# Patient Record
Sex: MALE | Race: WHITE | NOT HISPANIC OR LATINO
[De-identification: names, ages, dates, MRNs, and addresses within clinical notes are randomized per-mention and may not be internally consistent; named-entity substitution may affect disease eponyms.]

---

## 2019-05-15 PROBLEM — Z00.00 ENCOUNTER FOR PREVENTIVE HEALTH EXAMINATION: Status: ACTIVE | Noted: 2019-05-15

## 2019-05-23 ENCOUNTER — APPOINTMENT (OUTPATIENT)
Dept: UROLOGY | Facility: CLINIC | Age: 54
End: 2019-05-23

## 2022-12-30 ENCOUNTER — APPOINTMENT (OUTPATIENT)
Dept: ORTHOPEDIC SURGERY | Facility: CLINIC | Age: 57
End: 2022-12-30
Payer: COMMERCIAL

## 2022-12-30 VITALS — HEIGHT: 74 IN | WEIGHT: 250 LBS | BODY MASS INDEX: 32.08 KG/M2

## 2022-12-30 DIAGNOSIS — Z78.9 OTHER SPECIFIED HEALTH STATUS: ICD-10-CM

## 2022-12-30 DIAGNOSIS — F41.9 ANXIETY DISORDER, UNSPECIFIED: ICD-10-CM

## 2022-12-30 DIAGNOSIS — E78.00 PURE HYPERCHOLESTEROLEMIA, UNSPECIFIED: ICD-10-CM

## 2022-12-30 DIAGNOSIS — I10 ESSENTIAL (PRIMARY) HYPERTENSION: ICD-10-CM

## 2022-12-30 DIAGNOSIS — F32.A ANXIETY DISORDER, UNSPECIFIED: ICD-10-CM

## 2022-12-30 PROCEDURE — 99204 OFFICE O/P NEW MOD 45 MIN: CPT

## 2022-12-30 RX ORDER — GABAPENTIN 100 MG
100 TABLET ORAL
Refills: 0 | Status: ACTIVE | COMMUNITY

## 2022-12-30 RX ORDER — BUPRENORPHINE HCL/NALOXONE HCL 8 MG-2 MG
TABLET, SUBLINGUAL SUBLINGUAL
Refills: 0 | Status: ACTIVE | COMMUNITY

## 2022-12-30 RX ORDER — CLONAZEPAM 0.5 MG/1
0.5 TABLET, ORALLY DISINTEGRATING ORAL
Refills: 0 | Status: ACTIVE | COMMUNITY

## 2022-12-30 RX ORDER — VILAZODONE HYDROCHLORIDE 40 MG/1
40 TABLET ORAL
Refills: 0 | Status: ACTIVE | COMMUNITY

## 2022-12-30 RX ORDER — LISINOPRIL 30 MG/1
TABLET ORAL
Refills: 0 | Status: ACTIVE | COMMUNITY

## 2022-12-30 RX ORDER — QUETIAPINE FUMARATE 100 MG/1
100 TABLET ORAL
Refills: 0 | Status: ACTIVE | COMMUNITY

## 2022-12-30 RX ORDER — OMEPRAZOLE 40 MG/1
40 CAPSULE, DELAYED RELEASE ORAL
Refills: 0 | Status: ACTIVE | COMMUNITY

## 2022-12-30 NOTE — DISCUSSION/SUMMARY
[de-identified] : The patient has tried  physical therapy, anti-inflammatories, rest, RICE, all with no relief. Let this note serve as a letter of medical necessity for MRI. They will have a left knee MRI to evaluate for MMT. They will follow up with me after test.\par ------------------------\par \par Home Exercise \par \par  The patient is instructed on a home exercise program. \par  \par RICE \par I explained to the patient that rest, ice, compression, and elevation would benefit them.  They may return to activity after follow-up or when they no longer have any pain. \par  \par Pain Guide Activities \par The patient was advised to let pain guide the gradual advancement of activities. \par  \par Activity Modification \par The patient was advised to modify their activities. \par  \par Dx / Natural History \par The patient was advised of the diagnosis.  The natural history of the pathology was explained in full to the patient in layman's terms.  Several different treatment options were discussed and explained in full to the patient including the risks and benefits of both surgical and non-surgical treatments.  All questions and concerns were answered.\par \par "Written by Curtis Beltran, acting as Scribe for Gustavo Schmitt M.D"\par

## 2022-12-30 NOTE — HISTORY OF PRESENT ILLNESS
[de-identified] : The patient is a 57 year old L hand dominant male who presents today complaining of left knee.  Swelling, medial and posterior knee pain. Has been using voltaren ice, heat and rest for treatment. Original injury occurred while walking. The patient felt a "lightning bolt" severe pain in the knee and couldn't’t walk for three days. Pain has slowly improved since onset. \par Date of Injury/Onset: 1 month ago\par Pain:    At Rest: 2/10 \par With Activity: 9 /10 \par Mechanism of injury: no specific injury.\par This is not a Work Related Injury being treated under Worker's Compensation.\par This is not an athletic injury occurring associated with an interscholastic or organized sports team.\par Quality of symptoms: instability, weakness, swelling, pain medial aspect of the knee\par Improves with: cane, rest, arthritic ointment, heat\par Worse with: stairs, walking, \par Prior treatment: talat Fabian\par Prior Imaging: Xray 12/20/22\par Out of work/sport: _, since _ retired\par School/Sport/Position/Occupation:\par Additional Information: ACL reconstruction L knee 1999, intermittent discomfort since then, he states over the past month the pain has become much worse with no specific new injury.\par

## 2022-12-30 NOTE — PHYSICAL EXAM
[5___] : hamstring 5[unfilled]/5 [Left] : left knee [All Views] : anteroposterior, lateral, skyline, and anteroposterior standing [Positive] : positive Sulma [de-identified] : Neurologic: normal coordination, normal DTR UE/LE , normal sensation, normal mood and affect, orientated and able to communicate.\par \par Skin: normal skin, no rash, no ulcers and no lesions.\par \par Constitutional: well developed and well nourished.\par \par  [] : non-antalgic [FreeTextEntry9] : Khushbu johnson 12/20/22 left knee xray: mild tricompartmental OA. Well placed Boyer jewell from ACL reconstruction (1999). [TWNoteComboBox7] : flexion 120 degrees [de-identified] : extension 20 degrees

## 2023-01-13 ENCOUNTER — APPOINTMENT (OUTPATIENT)
Dept: ORTHOPEDIC SURGERY | Facility: CLINIC | Age: 58
End: 2023-01-13

## 2023-04-18 ENCOUNTER — APPOINTMENT (OUTPATIENT)
Dept: ORTHOPEDIC SURGERY | Facility: CLINIC | Age: 58
End: 2023-04-18
Payer: COMMERCIAL

## 2023-04-18 VITALS — WEIGHT: 250 LBS | BODY MASS INDEX: 32.08 KG/M2 | HEIGHT: 74 IN

## 2023-04-18 PROCEDURE — 99214 OFFICE O/P EST MOD 30 MIN: CPT | Mod: 25

## 2023-04-18 PROCEDURE — 20611 DRAIN/INJ JOINT/BURSA W/US: CPT | Mod: LT

## 2023-04-18 NOTE — DISCUSSION/SUMMARY
[de-identified] : Reviewed and discussed all images with patient. All questions answered. Discussed surgical vs conservative management. Will pursue conservative management at this time.\par Reparel card and playmaker brace rx.\par Physical therapy prescribed for strengthening and stretching. \par Fu in 6 weeks.\par \par CSI injection of the left knee was provided.\par Aspirated 10cc of clear synovial fluid from left knee using ultrasound for proper needle placement.\par \par \par \par Left Knee Ultrasound Guided aspiration/steroid injection procedure note:\par Patient Identification\par Name/: Verbal with patient and/or family\par  \par Procedure Verification:\par Procedure confirmed with patient or family/designee\par Consent for procedure: Verbal Consent Given\par Relevant documentation completed, reviewed, and signed\par Clinical indications for procedure confirmed\par \par Time-out with all members of procedure team immediately prior to procedure:\par Correct patient identified. Agreement on procedure. Correct side and site.\par \par KNEE INTRAARTICULAR INJECTION - LEFT\par After verbal consent and identification of the correct patient and correct site, the LEFT superolateral knee was prepped using alcohol swabs and betadine. This was allowed time to air dry.  A mixture of Kenalog,  Bupicacaine  was injected into the left knee using a sterile 22G 1.5 inch needle.  The patient tolerated the procedure well.  A sterile dressing was placed.  After-care instructions were provided and included instructions to ice the area and to call if redness, pain, or fever develop.\par \par \par ULTRASOUND GUIDED ASPIRATION - PROCEDURE\par Risks and benefits of aspiration were explained to the patient including but not limited to infection, recurrent hemarthrosis, pain at needle site, and recurrence of effusion.  The knee was prepped in the usual sterile fashion and under sterile condition injection of 3 cc of lidocaine was injected into the subcutaneous tissue.  Followed by aspiration using an 18 gauge needle.   The patient was instructed to rest, ice, and place compression on the knee for at least 24 hours.   There were also instructed to call for fevers drainage, increasing pain or any other concerns. \par ------------------------\par \par Home Exercise \par \par  The patient is instructed on a home exercise program. \par  \par RICE \par I explained to the patient that rest, ice, compression, and elevation would benefit them.  They may return to activity after follow-up or when they no longer have any pain. \par  \par Pain Guide Activities \par The patient was advised to let pain guide the gradual advancement of activities. \par  \par Activity Modification \par The patient was advised to modify their activities. \par  \par Dx / Natural History \par The patient was advised of the diagnosis.  The natural history of the pathology was explained in full to the patient in layman's terms.  Several different treatment options were discussed and explained in full to the patient including the risks and benefits of both surgical and non-surgical treatments.  All questions and concerns were answered.\par \par "Written by Curtis Beltran, acting as Scribe for Gustavo Schmitt M.D"\par

## 2023-04-18 NOTE — HISTORY OF PRESENT ILLNESS
[de-identified] : The patient is a 57 year old L hand dominant male who presents today complaining of left knee. Original injury occurred while walking. The patient felt a "lightning bolt" severe pain in the knee and couldn't’t walk for three days. . \par Date of Injury/Onset: \par Pain: At Rest: 2/10 \par With Activity: 5 /10 \par Mechanism of injury: no specific injury.\par This is not a Work Related Injury being treated under Worker's Compensation.\par This is not an athletic injury occurring associated with an interscholastic or organized sports team.\par Quality of symptoms: instability, weakness, swelling, pain medial aspect of the knee\par Improves with: cane, rest, arthritic ointment, heat\par Worse with: stairs, walking, \par changes since last visit: MRI at [facility], sxs have improved \par Additional Information: ACL reconstruction L knee 1999, intermittent discomfort since then, he states over the past month the pain has become much worse with no specific new injury.\par  \par  \par

## 2023-04-18 NOTE — ASSESSMENT
[FreeTextEntry1] : 01/09/23\par [facility] Radiology MRI Left Knee\par Impression:\par Markedly limited study as the patient could not be completed due to significant susceptibility artifact from the ACL reconstruction. Quadriceps tendinopathy and insertional fraying. Joint effusion. \par Incompletely evaluated small popliteal cyst.

## 2023-04-18 NOTE — PHYSICAL EXAM
[5___] : hamstring 5[unfilled]/5 [Positive] : positive Sulma [Left] : left knee [All Views] : anteroposterior, lateral, skyline, and anteroposterior standing [de-identified] : Neurologic: normal coordination, normal DTR UE/LE , normal sensation, normal mood and affect, orientated and able to communicate.\par \par Skin: normal skin, no rash, no ulcers and no lesions.\par \par Constitutional: well developed and well nourished.\par \par  [] : non-antalgic [FreeTextEntry9] : Khushbu johnson 12/20/22 left knee xray: mild tricompartmental OA. Well placed Boyer jewell from ACL reconstruction (1999). [TWNoteComboBox7] : flexion 120 degrees [de-identified] : extension 0 degrees

## 2023-05-30 ENCOUNTER — APPOINTMENT (OUTPATIENT)
Dept: ORTHOPEDIC SURGERY | Facility: CLINIC | Age: 58
End: 2023-05-30

## 2023-10-04 ENCOUNTER — APPOINTMENT (OUTPATIENT)
Dept: NEUROSURGERY | Facility: CLINIC | Age: 58
End: 2023-10-04
Payer: COMMERCIAL

## 2023-10-04 ENCOUNTER — NON-APPOINTMENT (OUTPATIENT)
Age: 58
End: 2023-10-04

## 2023-10-04 VITALS
HEART RATE: 93 BPM | BODY MASS INDEX: 31.44 KG/M2 | HEIGHT: 74 IN | WEIGHT: 245 LBS | DIASTOLIC BLOOD PRESSURE: 76 MMHG | TEMPERATURE: 98 F | SYSTOLIC BLOOD PRESSURE: 153 MMHG | OXYGEN SATURATION: 96 %

## 2023-10-04 DIAGNOSIS — Z87.19 PERSONAL HISTORY OF OTHER DISEASES OF THE DIGESTIVE SYSTEM: ICD-10-CM

## 2023-10-04 PROCEDURE — 99203 OFFICE O/P NEW LOW 30 MIN: CPT

## 2023-10-04 RX ORDER — TESTOSTERONE 25 MG/2.5G
25 GEL TRANSDERMAL
Refills: 0 | Status: ACTIVE | COMMUNITY

## 2023-10-04 RX ORDER — FAMOTIDINE 40 MG/1
40 TABLET, FILM COATED ORAL
Refills: 0 | Status: ACTIVE | COMMUNITY

## 2023-10-04 RX ORDER — ATORVASTATIN CALCIUM 20 MG/1
20 TABLET, FILM COATED ORAL
Refills: 0 | Status: ACTIVE | COMMUNITY

## 2023-10-04 RX ORDER — CALCIUM CARBONATE/VITAMIN D3 600 MG-10
TABLET ORAL
Refills: 0 | Status: ACTIVE | COMMUNITY

## 2023-10-20 ENCOUNTER — APPOINTMENT (OUTPATIENT)
Dept: MRI IMAGING | Facility: CLINIC | Age: 58
End: 2023-10-20
Payer: COMMERCIAL

## 2023-10-20 ENCOUNTER — OUTPATIENT (OUTPATIENT)
Dept: OUTPATIENT SERVICES | Facility: HOSPITAL | Age: 58
LOS: 1 days | End: 2023-10-20

## 2023-10-20 ENCOUNTER — APPOINTMENT (OUTPATIENT)
Dept: RADIOLOGY | Facility: CLINIC | Age: 58
End: 2023-10-20
Payer: COMMERCIAL

## 2023-10-20 ENCOUNTER — OUTPATIENT (OUTPATIENT)
Dept: OUTPATIENT SERVICES | Facility: HOSPITAL | Age: 58
LOS: 1 days | End: 2023-10-20
Payer: COMMERCIAL

## 2023-10-20 DIAGNOSIS — M48.02 SPINAL STENOSIS, CERVICAL REGION: ICD-10-CM

## 2023-10-20 DIAGNOSIS — Z00.8 ENCOUNTER FOR OTHER GENERAL EXAMINATION: ICD-10-CM

## 2023-10-20 PROCEDURE — 74018 RADEX ABDOMEN 1 VIEW: CPT | Mod: 26

## 2023-10-20 PROCEDURE — 72148 MRI LUMBAR SPINE W/O DYE: CPT | Mod: 26

## 2023-10-20 PROCEDURE — 71045 X-RAY EXAM CHEST 1 VIEW: CPT | Mod: 26

## 2023-10-20 PROCEDURE — 72141 MRI NECK SPINE W/O DYE: CPT | Mod: 26

## 2023-10-20 PROCEDURE — 74018 RADEX ABDOMEN 1 VIEW: CPT

## 2023-10-20 PROCEDURE — 71045 X-RAY EXAM CHEST 1 VIEW: CPT

## 2023-10-20 PROCEDURE — 72148 MRI LUMBAR SPINE W/O DYE: CPT

## 2023-10-20 PROCEDURE — 72141 MRI NECK SPINE W/O DYE: CPT

## 2023-10-26 ENCOUNTER — NON-APPOINTMENT (OUTPATIENT)
Age: 58
End: 2023-10-26

## 2023-11-15 ENCOUNTER — APPOINTMENT (OUTPATIENT)
Dept: NEUROSURGERY | Facility: CLINIC | Age: 58
End: 2023-11-15
Payer: COMMERCIAL

## 2023-11-15 ENCOUNTER — TRANSCRIPTION ENCOUNTER (OUTPATIENT)
Age: 58
End: 2023-11-15

## 2023-11-15 VITALS
OXYGEN SATURATION: 97 % | HEIGHT: 74 IN | WEIGHT: 245 LBS | HEART RATE: 85 BPM | BODY MASS INDEX: 31.44 KG/M2 | DIASTOLIC BLOOD PRESSURE: 88 MMHG | TEMPERATURE: 98.1 F | SYSTOLIC BLOOD PRESSURE: 144 MMHG

## 2023-11-15 PROCEDURE — 99214 OFFICE O/P EST MOD 30 MIN: CPT

## 2023-11-29 ENCOUNTER — APPOINTMENT (OUTPATIENT)
Dept: CT IMAGING | Facility: CLINIC | Age: 58
End: 2023-11-29
Payer: COMMERCIAL

## 2023-11-29 ENCOUNTER — OUTPATIENT (OUTPATIENT)
Dept: OUTPATIENT SERVICES | Facility: HOSPITAL | Age: 58
LOS: 1 days | End: 2023-11-29
Payer: COMMERCIAL

## 2023-11-29 DIAGNOSIS — M48.02 SPINAL STENOSIS, CERVICAL REGION: ICD-10-CM

## 2023-11-29 PROCEDURE — 72125 CT NECK SPINE W/O DYE: CPT

## 2023-11-29 PROCEDURE — 72125 CT NECK SPINE W/O DYE: CPT | Mod: 26

## 2023-12-04 ENCOUNTER — NON-APPOINTMENT (OUTPATIENT)
Age: 58
End: 2023-12-04

## 2023-12-04 ENCOUNTER — APPOINTMENT (OUTPATIENT)
Dept: NEUROSURGERY | Facility: CLINIC | Age: 58
End: 2023-12-04
Payer: COMMERCIAL

## 2023-12-04 VITALS
OXYGEN SATURATION: 95 % | HEIGHT: 74 IN | BODY MASS INDEX: 31.44 KG/M2 | DIASTOLIC BLOOD PRESSURE: 87 MMHG | TEMPERATURE: 97.8 F | WEIGHT: 245 LBS | SYSTOLIC BLOOD PRESSURE: 135 MMHG | HEART RATE: 84 BPM

## 2023-12-04 PROCEDURE — 99214 OFFICE O/P EST MOD 30 MIN: CPT

## 2024-01-16 ENCOUNTER — APPOINTMENT (OUTPATIENT)
Dept: ORTHOPEDIC SURGERY | Facility: CLINIC | Age: 59
End: 2024-01-16
Payer: COMMERCIAL

## 2024-01-16 VITALS — BODY MASS INDEX: 31.44 KG/M2 | WEIGHT: 245 LBS | HEIGHT: 74 IN

## 2024-01-16 DIAGNOSIS — M25.562 PAIN IN RIGHT KNEE: ICD-10-CM

## 2024-01-16 DIAGNOSIS — M25.562 PAIN IN LEFT KNEE: ICD-10-CM

## 2024-01-16 DIAGNOSIS — M79.18 MYALGIA, OTHER SITE: ICD-10-CM

## 2024-01-16 DIAGNOSIS — M25.561 PAIN IN RIGHT KNEE: ICD-10-CM

## 2024-01-16 PROCEDURE — 99214 OFFICE O/P EST MOD 30 MIN: CPT | Mod: 25

## 2024-01-16 PROCEDURE — 20611 DRAIN/INJ JOINT/BURSA W/US: CPT | Mod: LT

## 2024-01-16 PROCEDURE — 20610 DRAIN/INJ JOINT/BURSA W/O US: CPT | Mod: RT

## 2024-01-16 PROCEDURE — 73564 X-RAY EXAM KNEE 4 OR MORE: CPT | Mod: 50

## 2024-01-16 NOTE — PHYSICAL EXAM
[de-identified] : Left Knee: medial joint line tenderness +medial Thais's +locking Full ROM  Pain with full extension  Medial buckling

## 2024-01-16 NOTE — HISTORY OF PRESENT ILLNESS
[de-identified] : The patient is a 58 year  old  {LEFT] hand dominant male who presents today complaining of BL knees   Date of Injury/Onset: 1 years Pain:    At Rest: 2/10  With Activity:  6/10  Mechanism of injury: n/a  This is NOT a Work Related Injury being treated under Worker's Compensation. This is NOT an athletic injury occurring associated with an interscholastic or organized sports team. Quality of symptoms: sharp, aching  Improves with: CSI, ice/ heat, voltaren Worse with: stairs, prolonged activity, bending  Treatment/Imaging/Studies Since Last Visit: n/a 	Reports Available For Review Today: _ Out of work/sport: _, since _ School/Sport/Position/Occupation: retired  Change since last visit: csi last visit in April helped the left knee significantly, he reports new onset pain to the right knee  Additional Information: None

## 2024-01-16 NOTE — DISCUSSION/SUMMARY
[de-identified] : Reviewed all images with patient.  RB&A to corticosteroid injection discussed. All questions were answered. Patient wishes to move forward with injection today.  Right Knee Ultrasound Guided aspiration/steroid injection procedure note: Patient Identification Name/: Verbal with patient and/or family   Procedure Verification: Procedure confirmed with patient or family/designee Consent for procedure: Verbal Consent Given Relevant documentation completed, reviewed, and signed Clinical indications for procedure confirmed  Time-out with all members of procedure team immediately prior to procedure: Correct patient identified. Agreement on procedure. Correct side and site.  KNEE INTRAARTICULAR INJECTION - RIGHT After verbal consent and identification of the correct patient and correct site, the RIGHT superolateral knee was prepped using alcohol swabs and betadine. This was allowed time to air dry.  A mixture of Kenalog,  Bupicacaine  was injected into the right knee using a sterile 22G 1.5 inch needle.  The patient tolerated the procedure well.  A sterile dressing was placed.  After-care instructions were provided and included instructions to ice the area and to call if redness, pain, or fever develop.   Left Knee Ultrasound Guided aspiration/steroid injection procedure note: Patient Identification Name/: Verbal with patient and/or family   Procedure Verification: Procedure confirmed with patient or family/designee Consent for procedure: Verbal Consent Given Relevant documentation completed, reviewed, and signed Clinical indications for procedure confirmed  Time-out with all members of procedure team immediately prior to procedure: Correct patient identified. Agreement on procedure. Correct side and site.  KNEE INTRAARTICULAR INJECTION - LEFT After verbal consent and identification of the correct patient and correct site, the LEFT superolateral knee was prepped using alcohol swabs and betadine. This was allowed time to air dry.  A mixture of Kenalog,  Bupicacaine  was injected into the left knee using a sterile 22G 1.5 inch needle.  The patient tolerated the procedure well.  A sterile dressing was placed.  After-care instructions were provided and included instructions to ice the area and to call if redness, pain, or fever develop.   ----------------------------------------------- Home Exercise The patient is instructed on a home exercise program.  STEPHANIE BOWER Acting as a Scribe for Dr. Keshia HILL, Stephanie Bower, attest that this documentation has been prepared under the direction and in the presence of Provider Gustavo Schmitt MD.  Activity Modification The patient was advised to modify their activities.  Dx / Natural History The patient was advised of the diagnosis.  The natural history of the pathology was explained in full to the patient in layman's terms.  Several different treatment options were discussed and explained in full to the patient including the risks and benefits of both surgical and non-surgical treatments.  All questions and concerns were answered.  Pain Guide Activities The patient was advised to let pain guide the gradual advancement of activities.  RICE I explained to the patient that rest, ice, compression, and elevation would benefit them.  They may return to activity after follow-up or when they no longer have any pain.  The patient's current medication management of their orthopedic diagnosis was reviewed today: (1) We discussed a comprehensive treatment plan that included possible pharmaceutical management involving the use of prescription strength medications including but not limited to options such as oral Naprosyn 500mg BID, once daily Meloxicam 15 mg, or 500-650 mg Tylenol versus over the counter oral medications and topical prescription NSAID Pennsaid vs over the counter Voltaren gel. (2) There is a moderate risk of morbidity with further treatment, especially from use of prescription strength medications and possible side effects of these medications which include upset stomach with oral medications, skin reactions to topical medications and cardiac/renal issues with long term use. (3) I recommended that the patient follow-up with their medical physician to discuss any significant specific potential issues with long term medication use such as interactions with current medications or with exacerbation of underlying medical comorbidities. (4) The benefits and risks associated with use of injectable, oral or topical, prescription and over the counter anti-inflammatory medications were discussed with the patient. The patient voiced understanding of the risks including but not limited to bleeding, stroke, kidney dysfunction, heart disease, and were referred to the black box warning label for further information.

## 2024-01-16 NOTE — DATA REVIEWED
[FreeTextEntry1] : 01/16/24 [facility] X-Ray Examination of the RIGHT KNEE: 4 views: Moderate tricompartmental OA

## 2024-02-06 ENCOUNTER — TRANSCRIPTION ENCOUNTER (OUTPATIENT)
Age: 59
End: 2024-02-06

## 2024-02-15 ENCOUNTER — TRANSCRIPTION ENCOUNTER (OUTPATIENT)
Age: 59
End: 2024-02-15

## 2024-02-29 ENCOUNTER — APPOINTMENT (OUTPATIENT)
Dept: NEUROSURGERY | Facility: CLINIC | Age: 59
End: 2024-02-29
Payer: COMMERCIAL

## 2024-02-29 VITALS
TEMPERATURE: 97.8 F | BODY MASS INDEX: 31.44 KG/M2 | HEART RATE: 82 BPM | OXYGEN SATURATION: 97 % | HEIGHT: 74 IN | DIASTOLIC BLOOD PRESSURE: 96 MMHG | WEIGHT: 245 LBS | SYSTOLIC BLOOD PRESSURE: 147 MMHG

## 2024-02-29 PROCEDURE — 99213 OFFICE O/P EST LOW 20 MIN: CPT

## 2024-03-07 NOTE — HISTORY OF PRESENT ILLNESS
[FreeTextEntry1] : Mr. Briscoe presents for follow-up.  He has continued improvement in symptoms referable to his neck and cervical DDD.  The patient has persistent back and LE pain without incontinence or balance difficulty.  The patient has started PT.  He noted distal LE weakness after a bumpy car ride which has since improved.

## 2024-03-07 NOTE — ASSESSMENT
[FreeTextEntry1] : Mr. Briscoe presents with interval history as above.  He has noted improvement with PT but had a recent setback after a car ride.  I will see the patient back in 4-6 weeks to assess his progress with PT.  He knows to call the office with any new or concerning symptoms in the interim.

## 2024-03-07 NOTE — PHYSICAL EXAM
[General Appearance - Alert] : alert [General Appearance - In No Acute Distress] : in no acute distress [General Appearance - Well Nourished] : well nourished [General Appearance - Well Developed] : well developed [General Appearance - Well-Appearing] : healthy appearing [Oriented To Time, Place, And Person] : oriented to person, place, and time [Person] : oriented to person [Place] : oriented to place [Time] : oriented to time [Short Term Intact] : short term memory intact [Concentration Intact] : normal concentrating ability [Fluency] : fluency intact [Cranial Nerves Optic (II)] : visual acuity intact bilaterally,  pupils equal round and reactive to light [Cranial Nerves Oculomotor (III)] : extraocular motion intact [Cranial Nerves Facial (VII)] : face symmetrical [Cranial Nerves Hypoglossal (XII)] : there was no tongue deviation with protrusion [Motor Tone] : muscle tone was normal in all four extremities [Sensation Tactile Decrease] : light touch was intact [Motor Strength] : muscle strength was normal in all four extremities [FreeTextEntry6] : UE and LE 5/5 bilaterally

## 2024-04-01 ENCOUNTER — APPOINTMENT (OUTPATIENT)
Dept: PHYSICAL MEDICINE AND REHAB | Facility: CLINIC | Age: 59
End: 2024-04-01
Payer: COMMERCIAL

## 2024-04-01 VITALS
SYSTOLIC BLOOD PRESSURE: 165 MMHG | RESPIRATION RATE: 15 BRPM | HEIGHT: 74 IN | HEART RATE: 102 BPM | WEIGHT: 240 LBS | BODY MASS INDEX: 30.8 KG/M2 | DIASTOLIC BLOOD PRESSURE: 74 MMHG

## 2024-04-01 PROCEDURE — 99204 OFFICE O/P NEW MOD 45 MIN: CPT

## 2024-04-01 RX ORDER — PRAVASTATIN SODIUM 40 MG/1
40 TABLET ORAL
Refills: 0 | Status: DISCONTINUED | COMMUNITY
End: 2024-04-01

## 2024-04-01 NOTE — PHYSICAL EXAM
[FreeTextEntry1] : PE: Constitutional: In NAD, calm and cooperative MSK (Neck and Back) 	Inspection: no gross swelling identified 	Palpation: Tenderness of the bilateral lower cervical and lumbar paraspinals 	ROM: Pain at end cervical extension/flexion and with lumbar extension/flexion 	Strength: 5/5 strength in bilateral upper and lower extremities 	Reflexes: 2+ Biceps/Brachioradialis/Triceps/patella/Achilles reflex bilaterally, Hoffmans/Clonus negative bilaterally 	Sensation: Intact to light touch in bilateral upper and lower extremities 	Special tests: Spurlings test negative bilaterally, SLR negative bilaterally, SAGAR/FADIR negative bilaterally

## 2024-04-01 NOTE — ASSESSMENT
[FreeTextEntry1] : Mr. BRYAN OLIVIER is a 59 year old male who presents with chronic neck and low back pain, worst in the low back with some radiation down his bilateral LE. Patient has significant spondylosis seen on MRI. Denies any red flag signs. Will recommend: - Discussed with patient the risks (including but not limited to bleeding, elevated blood sugar, allergic reaction, infection, nerve damage, etc), benefits and alternatives to an epidural steroid injection for which patient understands and would like to proceed. Given significant foraminal stenosis at L5-S1, will plan for a bilateral S1 TFESI.  - Patient to f/u with neurosurgery regarding his significant cervical stenosis  Return for procedure. Patient aware of red flag signs including any changes to their bowel/bladder control, groin numbness or new weakness. Patient knows to seek immediate attention by calling 911 or going to nearest ER if these symptoms appear.

## 2024-04-01 NOTE — DATA REVIEWED
[FreeTextEntry1] : EXAM: [id number] - MR SPINE CERVICAL  - ORDERED BY:  RASHID BONE   PROCEDURE DATE:  10/20/2023    INTERPRETATION:  MR CERVICAL SPINE  CLINICAL INFORMATION: patient has C5- C6  gait instability; TECHNIQUE: MR CERVICAL SPINE COMPARISON: None available.  FINDINGS:  DISC LEVEL EVALUATION:  C1/C2: Spurring at the atlantodental articulation. No central canal narrowing. C2/C3: Tiny central protrusion. Bulky left facet arthrosis. Right uncovertebral spurring. Mild right and moderate to severe left foraminal narrowing. C3/C4: Small central protrusion with right greater than left uncovertebral spurring. Moderate left facet arthrosis. Mild narrowing of the spinal canal. Moderate right and moderate to severe left foraminal narrowing. C4/C5: Diffuse disc bulge asymmetric to the left. Bulky left greater than right uncovertebral spurring. Mild bilateral facet arthrosis. Moderate spinal canal stenosis. Severe left greater than right foraminal narrowing. C5/C6: Diffuse disc bulge with small amount of disc material extruded centrally which indents the cord. Moderate spinal canal stenosis. Severe left greater than right foraminal narrowing. C6/C7: Mild disc bulge. Mild spinal canal stenosis. Moderate to severe right greater than left foraminal narrowing. C7/T1: Broad central protrusion. Bulky left facet arthrosis. Mild to moderate spinal canal stenosis. Mild left foraminal narrowing. T1/T2: Only evaluated in the sagittal plane. Broad central protrusion which narrows the spinal canal and severe right greater than left foraminal narrowing.  ALIGNMENT: Straightening of the normal cervical lordosis. Slight anterolisthesis of C7 on T1. Slight retrolisthesis of C6 on C7. CORD: No cord signal abnormality. MARROW: No fracture. Endplate degenerative changes at C5-C6. DISCS: Moderate multilevel degenerative changes with loss of disc heights and disc signal. IMAGED BRAIN: Normal. PERIPHERAL/NECK SOFT TISSUES: Normal.  IMPRESSION:  Moderate multilevel cervical spondylosis, most notable at C5-C6 with impingement of the cord by extruded disc material and severe left greater than right foraminal narrowing. Additional levels of significant spinal canal stenosis and foraminal narrowing.  --- End of Report ---       SANGEETA HODGE MD; Attending Radiologist This document has been electronically signed. Oct 25 2023  4:58PM  	 EXAM: [id number] - MR SPINE LUMBAR  - ORDERED BY:  RASHID BONE   PROCEDURE DATE:  10/20/2023    INTERPRETATION:  CLINICAL INFORMATION: Low back pain radiating into the lower extremity.  ADDITIONAL CLINICAL INFORMATION: Other Condition see Clinical Info  TECHNIQUE: Multiplanar, multisequence MRI was performed of the lumbar spine. IV Contrast: None  PRIOR STUDIES: No priors available for comparison.  FINDINGS:  BONES: No fracture. Endplate degenerative changes at L1-L2, L2 on L3 and L3-4. Extensive endplate marrow edema at L1-L2. ALIGNMENT: Mild broad dextrocurvature. Slight retrolisthesis of T12 on L1, L1 on L2, L2 on L3 and L5 on S1. SACROILIAC JOINTS/SACRUM: There is no sacral fracture. The SI joints are partially visualized but are intact. CONUS AND CAUDA EQUINA: The distal cord and conus are normal in signal. Conus terminates at L1. VISUALIZED INTRAPELVIC/INTRA-ABDOMINAL SOFT TISSUES: Normal. PARASPINAL SOFT TISSUES: Paraspinal muscle atrophy. Mild prominence of the epidural fat.   INDIVIDUAL LEVELS: Multilevel loss of disc height and disc signal, severe at L3-L4.  LOWER THORACIC SPINE: Mild disc herniations at T10-T11 and T11-T12 minimally narrowing the spinal canal without significant foraminal narrowing.  T12/L1: Diffuse disc bulge. Mild to moderate spinal canal stenosis. Mild right greater than left foraminal narrowing. Likely contact on the right T12 exiting nerve. L1-L2: Diffuse disc bulge. Mild bilateral facet arthrosis. Complete effacement of the CSF. Moderate spinal canal narrowing. Moderate right and moderate to severe left foraminal narrowing. L2-L3: Diffuse disc bulge. Mild bilateral facet arthrosis. Near-complete effacement of the CSF. Mild to moderate spinal canal stenosis. Mild right and moderate to severe left foraminal narrowing. L3-L4: Diffuse disc bulge with osseous ridging asymmetric to the right. Bulky right and mild left facet arthrosis. Mild spinal canal stenosis. Moderate to severe right greater than left foraminal narrowing. Narrowing of the right lateral recess. Contact on the right L3 exiting nerve. L4-L5: Mild disc bulge. Moderate to severe right and moderate left facet arthrosis. Mild spinal canal stenosis. Mild to moderate bilateral foraminal narrowing. L5-S1: Diffuse disc bulge which contacts the L5 exiting nerves. Moderate bilateral facet arthrosis. Mild narrowing of the spinal canal. Severe bilateral foraminal narrowing.   IMPRESSION:  Moderate to severe multilevel lumbar spondylosis in the setting of epidural lipomatosis with multilevel spinal canal and foraminal narrowing.  --- End of Report ---       SANGEETA HODGE MD; Attending Radiologist This document has been electronically signed. Oct 25 2023  5:08PM  EXAM: [id number] - CT CERVICAL SPINE  - ORDERED BY:  RASHID BONE   PROCEDURE DATE:  11/29/2023    INTERPRETATION:  Clinical indication: Cervical stenosis  Technique: CT of the cervical spine without intravenous contrast was performed utilizing helically obtained axial images from the occiput through T1. Images were reformatted into coronal and sagittal orientation.  Comparison: 10/20/2023  Findings:  There is no acute fracture or subluxation of the cervical spine. There is no significant listhesis. There is straightening of the normal cervical lordosis. The vertebral bodies are of normal height and configuration. There is no evidence of prevertebral soft tissue swelling.  C2-C3 level: Moderate bilateral facet arthropathy contributes to mild bilateral neural foraminal narrowing but no significant central canal narrowing.  C3-C4 level: Mild to moderate bilateral facet arthropathy and mild bilateral uncovertebral hypertrophy results in mild to moderate bilateral neural foraminal narrowing but no significant central canal narrowing.  C4-C5 level:  Broad-based posterior disc osteophyte complex with superimposed central protrusion and moderate bilateral facet arthropathy result in moderate bilateral neural foraminal narrowing and mild central canal narrowing.  C5-C6 level: Broad-based posterior disc osteophyte complex with superimposed central disc protrusion in conjunction with moderate bilateral facet arthropathy results in moderate bilateral neural foraminal narrowing and moderate central canal narrowing.  C6-C7 level: Moderate bilateral facet arthropathy and broad-based posterior disc bulge result in moderate right neural foraminal narrowing, mild left neural foraminal narrowing but no significant central canal narrowing.  C7-T1 level: No disc herniation. No central canal or foraminal narrowing.   The prevertebral soft tissues are within normal limits. The lung apices are clear.  Impression:  Multilevel discogenic degenerative disease and facet arthropathy of the cervical spine, most pronounced at C5-C6 where there is moderate bilateral neural foraminal narrowing and moderate central canal narrowing. Additional varying degrees of neural foraminal narrowing, as above.  --- End of Report ---       FRANTZ GARCÍA MBA-FARIDA RENTERIA; Attending Radiologist This document has been electronically signed. Dec  4 2023 11:15AM

## 2024-04-01 NOTE — HISTORY OF PRESENT ILLNESS
[FreeTextEntry1] : Mr. BRYAN OLIVIER  is a 59 year old male who presents with neck and back pain.   Location: Both neck and low back, but worst in low back Onset: Chronic for years but worsening since Fall 2023, no inciting events Provocation/Palliative: Worst with activity, better with rest Quality:Shooting Radiation: Down LUE, but also down bilateral LE, R>L Severity: Moderate to severe Timing: Comes and goes  Denies any associated numbness. Denies any associated arm or hand weakness. Denies any loss of bowel/bladder control or any groin numbness. Previous medications trialed: Tylenol with some help, Gabapentin Previous procedures relevant to complaint: None Has tried conservative treatment?: PT x 6 weeks without significant relief

## 2024-04-04 ENCOUNTER — APPOINTMENT (OUTPATIENT)
Dept: NEUROSURGERY | Facility: CLINIC | Age: 59
End: 2024-04-04
Payer: COMMERCIAL

## 2024-04-04 PROCEDURE — 99214 OFFICE O/P EST MOD 30 MIN: CPT

## 2024-04-04 NOTE — REVIEW OF SYSTEMS
[As Noted in HPI] : as noted in HPI [Numbness] : numbness [Tingling] : tingling [Difficulty Walking] : difficulty walking [Negative] : Heme/Lymph

## 2024-04-10 ENCOUNTER — TRANSCRIPTION ENCOUNTER (OUTPATIENT)
Age: 59
End: 2024-04-10

## 2024-04-18 ENCOUNTER — APPOINTMENT (OUTPATIENT)
Dept: PHYSICAL MEDICINE AND REHAB | Facility: AMBULATORY SURGERY CENTER | Age: 59
End: 2024-04-18
Payer: COMMERCIAL

## 2024-04-18 PROCEDURE — 64483 NJX AA&/STRD TFRM EPI L/S 1: CPT | Mod: 50

## 2024-04-18 NOTE — PROCEDURE
[de-identified] : S1 Transforaminal Epidural Steroid Injection    Attending: Irwin Henriquez DO  PREOPERATIVE DIAGNOSIS: Lumbosacral Radiculopathy POSTOPERATIVE DIAGNOSIS: same  SEDATION: As per Anesthesia   PROCEDURE PERFORMED: Bilateral S1 Transforaminal Epidural Steroid Injection   ESTIMATED BLOOD LOSS:  None FLUOROSCOPY WAS USED.    PROCEDURE AND FINDINGS:   Patient was greeted in the pre-procedure holding area. The risk, benefits and alternatives to the procedure were again reviewed with the patient and written informed consent was placed in the chart. They were taken to the procedure room and positioned prone on the fluoroscopy table.  Prior to the procedure a time out was completed, verifying correct patient, procedure, and site.     An AP fluoroscopic  film was taken to identify the vertebral bodies and pedicles of interest. The fluoroscope was then obliqued ipsilaterally until the foramen was optimally visualized. The skin was prepped with chlorhexidine and draped in the usual sterile fashion. The skin and subcutaneous tissue overlying the aforementioned anatomic targets were anesthetized using a 27-gauge 1-1/2 inch needle with 1% preservative-free lidocaine for a total volume of 6 mls.     Then a 22-gauge 3.5 inch Quincke spinal needle was advanced under fluoroscopic guidance using an oblique view just inferior to the S1 pedicle. Then, utilizing AP and lateral fluoroscopic views, the position of the needle tip was confirmed to be within the neural foramen. After negative aspiration, 2 mls of contrast was injected under AP view on each side and confirmed adequate spread along the nerve root and in the epidural space. There was no evidence of intravascular uptake or intrathecal spread on imaging.    At this point, after negative aspiration, a total of 2 mL volume of treatment injectate, consisting of 0.5 mL of dexamethasone 10mg/mL, and 1.5 mL of Preservative Free Normal Saline was injected easily on each side.  The needles were then flushed with 1 ml of saline and removed. The needle insertion sites were dressed appropriately.    Patient was taken to the recovery room where they were monitored for a brief period of time. They tolerated the procedure well and were discharged home in stable condition with post procedural instructions.

## 2024-04-18 NOTE — DATA REVIEWED
[de-identified] : DATE OF EXAM: 07/03/2015 R. Phys. Name: Wanda Rosen R. Phys. Address: [address] [place] [address], [place], [facility] R. Phys. Phone: [phone] History: Neck pain with radiculopathy  MRI-3T CERVICAL SPINE W/O CON  MRI examination of the cervical spine was performed 3.0 Mari ultra high field wide bore magnet. Multiplanar multi-sequential techniques were used.  Sagittal imaging demonstrates normal alignment with straightening and a degenerative appearance of the vertebral bodies. The spinal cord has normal signal.  At C4-5 there is a central left lateral recess osteophytic ridge with asymmetric mass effect upon the exiting left C5 nerve root.  At C5-6 there is a large central slightly right-sided osteophytic disc ridge with moderate right paracentral cord compression mass effect upon the exiting C6 nerve roots right greater than left is also noted.  Review of the remaining levels does not demonstrate evidence of focal abnormality, disc herniation or stenosis.  IMPRESSION:   Multilevel degenerative spondylitic change most notable at C5-6 with there is moderate cord compression, see discussion above.  Signed by: Umer Medina MD Signed Date: 7/3/2015 12:17 PM   SIGNED BY: Umer Medina M.D., Ext. [phone] 07/03/2015 12:17 PM  IMPRESSION:   Multilevel degenerative spondylitic change most notable at C5-6 with there is moderate cord compression, see discussion above.  [de-identified] : DATE OF EXAM: 07/03/2015 R. Phys. Name: Wanda Rosen R. Phys. Address: [address] [place] [address], [place], [facility] R. Phys. Phone: [phone] History: Low back pain  MRI-3T LUMBAR SPINE W/O CON  MRI examination of the lumbosacral spine was performed on a 3.0 Mari ultra high field wide bore magnet and multiplanar multi-sequential techniques were used.  Examination of the sagittal imaging demonstrates normal alignment and a degenerative appearance of the vertebral bodies. The conus medullaris is seen to be normal.  The intervertebral disks demonstrate decreased T2 signal.  At L5-S1 there is a focal disc protrusion within the left neural foramen with slight mass effect upon the intraforaminal left L5 nerve root.  At L3-4 there is a focal disc herniation within the right neural foramen associated with diffuse degenerative bulging discs there is asymmetric mass effect upon the exiting right L4 and intraforaminal L3 nerve roots.  At L2-3 there is a diffuse degenerative bulging disc with moderate central and moderate to severe bilateral foraminal stenosis.  Review of the remaining levels does not demonstrate evidence of focal abnormality, disc herniation or stenosis.  IMPRESSION:   Multilevel degenerative disc disease as above.  Signed by: Umer Medina MD Signed Date: 7/3/2015 12:22 PM   SIGNED BY: Umer Medina M.D., Ext. [phone] 07/03/2015 12:22 PM  IMPRESSION:   Multilevel degenerative disc disease as above.

## 2024-04-18 NOTE — PHYSICAL EXAM
[General Appearance - Alert] : alert [General Appearance - In No Acute Distress] : in no acute distress [Oriented To Time, Place, And Person] : oriented to person, place, and time [Person] : oriented to person [Place] : oriented to place [4] : C6 extensor pollicis longus  4/5 [5] : S1 toe walking 5/5 [Sensation Tactile Decrease] : light touch was intact [3+] : Ankle jerk right 3+ [2+] : Ankle jerk left 2+ [Antalgic] : antalgic [Able to toe walk] : the patient was not able to toe walk [Able to heel walk] : the patient was not able to heel walk

## 2024-04-18 NOTE — ASSESSMENT
[FreeTextEntry1] : Mr. Briscoe presents with above history and imaging.  Patient is neurologically intact but is exhibiting early signs of myelopathy.  I have personally reviewed his MRI cervical spine that reveals C5- C6 stenosis with cord compression.  In addition, multilevel lumbar spinal stenosis Patient knows  he may be an appropriate candidate for ACDF C5-6.- he wishes to continue  PT    Antiinflammatory was recommended for management of symptoms and pain. Patient has been referred to physical therapy for strengthening and to decrease pain modalities and core strengthening.  We discussed the benefits of alternating heat, ice  and Icy Hot Cream.  Patient  may try gentle stretches at home in the interim.  Patient will follow up in 4-6 weeks for a formal clinical assessment and evaluate recovery. Patient has been given an opportunity to ask and have their questions answered to their satisfaction. Patient knows to call the office if there are any new or worsening symptoms.   I, Dr. Jose Hobson, personally performed the evaluation and management (E/M) services for this established patient who presents today. That E/M includes conducting the clinically appropriate interval history &/or exam and establishing a continued plan of care. Today, my HORACIO, Katy GuzmanMobentoJoanne, was here to observe my evaluation and management service for this patient and follow the plan of care established by me going forward.

## 2024-04-18 NOTE — REASON FOR VISIT
[Follow-Up: _____] : a [unfilled] follow-up visit [New Patient Visit] : a new patient visit [Referred By: _________] : Patient was referred by ELIUD [Spouse] : spouse [FreeTextEntry1] : Cervical spinal stenosis and lumbar disc herniation with radiculopathy

## 2024-04-18 NOTE — HISTORY OF PRESENT ILLNESS
[> 3 months] : more  than 3 months [Acetaminophen] : relieved by acetaminophen [Incontinence] : incontinence [Loss of Dexterity] : loss of dexterity [Urinary Ret.] : urinary retention [FreeTextEntry1] : Neck and lower back pain and gait instability [de-identified] : BRYAN OLIVIER is a 58 year old male presents for initial neurosurgical evaluation of cervical spinal stenosis, lumbar radiculopathy, gait instability, past surgical history includes  ACL reconstruction L knee 1999, patient is a retired .  He mentioned he has had longstanding neck and lower back pain but he states over the last few months it is worsened in nature.  He was seen and evaluated by his primary care about a month ago who had ordered x-ray of the cervical and lumbar spine.  It reveals significant degenerative diseases narrowing at C5-6 in addition there is some narrowing at L5-S1 and sacralization.  He does mention he has a MRI from 2015 that was also ordered from the primary care that showed a disc herniation at C5-6 and concerning lumbar spine stenosis at L3-L4 most consistent most prominent.  He endorses that he has pain in the neck that radiates into the arm he is left-hand dominant and has noticed some difficulty with grasping and grabbing objects he endorses that he has been having more difficulty with balance and coordination.  He denies any falls.  He has tried physical therapy in the past but never improvement per his report.  He has not had any pain management injections.  He has been managing his symptoms with NSAIDs which provide temporary relief but then symptoms are worse he finds it more easier to forward flex

## 2024-05-20 ENCOUNTER — APPOINTMENT (OUTPATIENT)
Dept: PHYSICAL MEDICINE AND REHAB | Facility: CLINIC | Age: 59
End: 2024-05-20
Payer: COMMERCIAL

## 2024-05-20 VITALS
DIASTOLIC BLOOD PRESSURE: 77 MMHG | WEIGHT: 240 LBS | HEART RATE: 99 BPM | SYSTOLIC BLOOD PRESSURE: 144 MMHG | BODY MASS INDEX: 30.8 KG/M2 | HEIGHT: 74 IN | RESPIRATION RATE: 15 BRPM

## 2024-05-20 PROCEDURE — 99214 OFFICE O/P EST MOD 30 MIN: CPT

## 2024-05-20 PROCEDURE — G2211 COMPLEX E/M VISIT ADD ON: CPT | Mod: NC,1L

## 2024-05-20 NOTE — HISTORY OF PRESENT ILLNESS
[FreeTextEntry1] : Mr. BRYAN OLIVIER is a 59 year old male who presents for follow up. At last visit, he was ordered a bilateral S1 TFESI and told to f/u with neurosurgery regarding his cervical stenosis. Neurosurgery referred him to PT for now. He underwent the LUCILLE on 4/18/24. He got nearly 100% relief from the LUCILLE but only for 3 days. He has been doing PT but feels like he might have plateaued.    Location: Both neck and low back, but worst in low back Onset: Chronic for years but worsening since Fall 2023, no inciting events Provocation/Palliative: Worst with activity, better with rest Quality:Shooting Radiation: Down LUE, but also down bilateral LE, R>>>L Severity: Moderate to severe Timing: Comes and goes  No bowel/bladder changes. No groin numbness.

## 2024-05-20 NOTE — ASSESSMENT
[FreeTextEntry1] : Mr. BRYAN OLIVIER is a 59 year old male who presents with chronic neck and low back pain, worst in the low back with some radiation down his bilateral LE. Patient has significant spondylosis seen on MRI. He got significant but temporary relief from recent LUCILLE. Denies any red flag signs. Will recommend: - Discussed with patient the risks (including but not limited to bleeding, elevated blood sugar, allergic reaction, infection, nerve damage, etc), benefits and alternatives to an epidural steroid injection for which patient understands and would like to proceed. Given significant foraminal stenosis at L5-S1, will plan for a repeat bilateral S1 TFESI. - Patient to f/u with neurosurgery regarding his significant cervical stenosis - He will try a new PT place, new Rx given.  - He will continue on occasional Tylenol, had to stop Ibuprofen recently due to upset stomach.   Return for procedure. Patient aware of red flag signs including any changes to their bowel/bladder control, groin numbness or new weakness. Patient knows to seek immediate attention by calling 911 or going to nearest ER if these symptoms appear.   This patient is being managed for a complex chronic pain that requires ongoing medical management. The nature of this condition requires a longitudinal relationship and monitoring over time for appropriate treatment.

## 2024-05-20 NOTE — PHYSICAL EXAM
[FreeTextEntry1] : PE: Constitutional: In NAD, calm and cooperative MSK (Neck and Back)  Inspection: no gross swelling identified, no swelling or erythema around injection site  Palpation: Tenderness of the bilateral lower cervical and lumbar paraspinals  ROM: Pain at end cervical extension/flexion and with lumbar extension/flexion  Strength: 5/5 strength in bilateral upper and lower extremities  Reflexes: 2+ Biceps/Brachioradialis/Triceps/patella/Achilles reflex bilaterally, Hoffmans/Clonus negative bilaterally  Sensation: Intact to light touch in bilateral upper and lower extremities  Special tests: Spurlings test negative bilaterally, SLR negative bilaterally, SAGAR/FADIR negative bilaterally.

## 2024-06-06 ENCOUNTER — APPOINTMENT (OUTPATIENT)
Dept: PHYSICAL MEDICINE AND REHAB | Facility: AMBULATORY SURGERY CENTER | Age: 59
End: 2024-06-06
Payer: COMMERCIAL

## 2024-06-06 DIAGNOSIS — M54.16 RADICULOPATHY, LUMBAR REGION: ICD-10-CM

## 2024-06-06 PROCEDURE — 64483 NJX AA&/STRD TFRM EPI L/S 1: CPT | Mod: 50

## 2024-06-06 NOTE — PROCEDURE
[de-identified] : S1 Transforaminal Epidural Steroid Injection    Attending: Irwin Henriquez DO  PREOPERATIVE DIAGNOSIS: Lumbosacral Radiculopathy POSTOPERATIVE DIAGNOSIS: same  SEDATION: As per Anesthesia   PROCEDURE PERFORMED:  S1 Transforaminal Epidural Steroid Injection on the bilateral sides   ESTIMATED BLOOD LOSS:  None FLUOROSCOPY WAS USED.    PROCEDURE AND FINDINGS:   Patient was greeted in the pre-procedure holding area. The risk, benefits and alternatives to the procedure were again reviewed with the patient and written informed consent was placed in the chart. They were taken to the procedure room and positioned prone on the fluoroscopy table.  Prior to the procedure a time out was completed, verifying correct patient, procedure, and site.     An AP fluoroscopic  film was taken to identify the vertebral bodies and pedicles of interest. The fluoroscope was then obliqued ipsilaterally until the foramen was optimally visualized. The skin was prepped with chlorhexidine and draped in the usual sterile fashion. The skin and subcutaneous tissue overlying the aforementioned anatomic targets were anesthetized using a 27-gauge 1-1/2 inch needle with 1% preservative-free lidocaine for a total volume of 5 mls.     Then a 22-gauge 3.5 inch Quincke spinal needle was advanced under fluoroscopic guidance using an oblique view just inferior to the S1 pedicle. Then, utilizing AP and lateral fluoroscopic views, the position of the needle tip was confirmed to be within the neural foramen. After negative aspiration, 2 mls of contrast was injected under AP view at each level and confirmed adequate spread along the nerve root and in the epidural space. There was no evidence of intravascular uptake or intrathecal spread on imaging.    At this point, after negative aspiration, a total of 2 mL volume of treatment injectate, consisting of 0.5 mL of dexamethasone 10mg/mL, and 1.5 mL of Preservative Free Normal Saline was injected easily.  The needle was then flushed with 1 ml of saline and removed. The needle insertion site was dressed appropriately. The exact same procedure was performed on the contralateral side.    Patient was taken to the recovery room where they were monitored for a brief period of time. They tolerated the procedure well and were discharged home in stable condition with post procedural instructions.  
present

## 2024-06-30 ENCOUNTER — NON-APPOINTMENT (OUTPATIENT)
Age: 59
End: 2024-06-30

## 2024-07-01 ENCOUNTER — APPOINTMENT (OUTPATIENT)
Dept: NEUROSURGERY | Facility: CLINIC | Age: 59
End: 2024-07-01
Payer: COMMERCIAL

## 2024-07-01 VITALS
BODY MASS INDEX: 30.8 KG/M2 | SYSTOLIC BLOOD PRESSURE: 133 MMHG | DIASTOLIC BLOOD PRESSURE: 81 MMHG | OXYGEN SATURATION: 96 % | HEIGHT: 74 IN | TEMPERATURE: 97.9 F | WEIGHT: 240 LBS | HEART RATE: 87 BPM

## 2024-07-01 DIAGNOSIS — M51.16 INTERVERTEBRAL DISC DISORDERS WITH RADICULOPATHY, LUMBAR REGION: ICD-10-CM

## 2024-07-01 DIAGNOSIS — M48.02 SPINAL STENOSIS, CERVICAL REGION: ICD-10-CM

## 2024-07-01 PROCEDURE — 99214 OFFICE O/P EST MOD 30 MIN: CPT

## 2024-07-02 PROBLEM — M48.02 DEGENERATIVE CERVICAL SPINAL STENOSIS: Status: ACTIVE | Noted: 2023-10-04

## 2024-07-02 PROBLEM — M51.16 LUMBAR DISC HERNIATION WITH RADICULOPATHY: Status: ACTIVE | Noted: 2023-10-05

## 2024-07-11 ENCOUNTER — APPOINTMENT (OUTPATIENT)
Dept: MRI IMAGING | Facility: CLINIC | Age: 59
End: 2024-07-11

## 2024-07-31 ENCOUNTER — OUTPATIENT (OUTPATIENT)
Dept: OUTPATIENT SERVICES | Facility: HOSPITAL | Age: 59
LOS: 1 days | End: 2024-07-31
Payer: COMMERCIAL

## 2024-07-31 ENCOUNTER — APPOINTMENT (OUTPATIENT)
Dept: MRI IMAGING | Facility: CLINIC | Age: 59
End: 2024-07-31
Payer: COMMERCIAL

## 2024-07-31 DIAGNOSIS — M51.16 INTERVERTEBRAL DISC DISORDERS WITH RADICULOPATHY, LUMBAR REGION: ICD-10-CM

## 2024-07-31 DIAGNOSIS — M54.16 RADICULOPATHY, LUMBAR REGION: ICD-10-CM

## 2024-07-31 DIAGNOSIS — M48.02 SPINAL STENOSIS, CERVICAL REGION: ICD-10-CM

## 2024-07-31 PROCEDURE — 72141 MRI NECK SPINE W/O DYE: CPT | Mod: 26

## 2024-07-31 PROCEDURE — 72148 MRI LUMBAR SPINE W/O DYE: CPT | Mod: 26

## 2024-07-31 PROCEDURE — 72141 MRI NECK SPINE W/O DYE: CPT

## 2024-07-31 PROCEDURE — 72148 MRI LUMBAR SPINE W/O DYE: CPT

## 2024-08-06 ENCOUNTER — APPOINTMENT (OUTPATIENT)
Dept: ORTHOPEDIC SURGERY | Facility: CLINIC | Age: 59
End: 2024-08-06

## 2024-08-06 ENCOUNTER — NON-APPOINTMENT (OUTPATIENT)
Age: 59
End: 2024-08-06

## 2024-08-06 ENCOUNTER — APPOINTMENT (OUTPATIENT)
Dept: NEUROSURGERY | Facility: CLINIC | Age: 59
End: 2024-08-06

## 2024-08-06 PROCEDURE — 99214 OFFICE O/P EST MOD 30 MIN: CPT | Mod: 25

## 2024-08-06 PROCEDURE — 20610 DRAIN/INJ JOINT/BURSA W/O US: CPT | Mod: RT

## 2024-08-06 PROCEDURE — 99214 OFFICE O/P EST MOD 30 MIN: CPT

## 2024-08-06 PROCEDURE — 20611 DRAIN/INJ JOINT/BURSA W/US: CPT | Mod: LT

## 2024-08-06 NOTE — ADDENDUM
CHIEF COMPLAINT/HPI: Handy is a 67 y.o. male for evaluation of Chronic Kidney Disease and proteinuria for a kidney biopsy.      HPI:  The patient is known to have HTN and DM II. He was noticed to have proteinuria with increase monoclonal Igg. The Pr/Cr ratio was 5.5. He was referred for a kidney biopsy. I have seen him today. I have explained the procedure with all the complication. I have answered the questions. He stated that in the current time he had multiple things going on with his health. We recommended home BP and blood sugar monitoring before each meal for a week and send us the readings.                  Past Medical History:   Diagnosis Date    Disorder of kidney and ureter      Edema leg      History of rotator cuff tear      History of seasonal allergies      HTN (hypertension)      Hx of colonic polyps      Hyperlipemia      NS (nuclear sclerosis), bilateral 06/25/2019    Severe nonproliferative diabetic retinopathy of both eyes with macular edema associated with type 2 diabetes mellitus 11/17/2017    Type 2 diabetes mellitus           Handy reports that he has never smoked. He has never used smokeless tobacco. He reports current alcohol use.           Family History   Problem Relation Age of Onset    Cancer Mother           Lung Cancer    Cancer Father           Colon cancer    Diabetes Father      Hypertension Father      No Known Problems Sister      No Known Problems Brother      No Known Problems Maternal Grandmother      No Known Problems Maternal Grandfather      No Known Problems Paternal Grandmother      No Known Problems Paternal Grandfather      No Known Problems Maternal Aunt      No Known Problems Maternal Uncle      No Known Problems Paternal Aunt      No Known Problems Paternal Uncle      Amblyopia Neg Hx      Blindness Neg Hx      Cataracts Neg Hx      Glaucoma Neg Hx      Macular degeneration Neg Hx      Retinal detachment Neg Hx      Strabismus Neg Hx      Stroke Neg Hx       [FreeTextEntry1] :  Documented by Kenroy Lozoya acting as a scribe for Dr. Schmitt and Raudel Welsh PA-C on 08/06/2024 and was presence for the following sections: Physical Exam; Data Reviewed; Assessment; Discussion/Summary. All medical record entries made by the Scribe were at my, Dr. Schmitt, and Raudel Welsh, direction and personally dictated by me on 08/06/2024. I have reviewed the chart and agree that the record accurately reflects my personal performance of the history, physical exam, procedure and imaging. Thyroid disease Neg Hx               Vitals:     11/15/22 0952   BP: 136/85   Pulse: 83   SpO2: 100%   Weight: 119 kg (262 lb 5.6 oz)                     [facility] - Short Stay Cardiac Unit  Operative Note     Date of Procedure: 7/19/2023      Procedure: Procedure(s) (LRB):  BIOPSY, KIDNEY (Left)      Kidney biopsy note: 7/19/2023      Handy Adams  1955  [id number]     Pre-op Diagnosis: Proteinuria, unspecified type [R80.9]   Post-op Diagnosis: Proteinuria, unspecified type [R80.9]     Procedure note: Lt. Kidney Biopsy.   The patient consent was obtained earlier after explaining all the possible complications.  The patient was placed prone with support below the abdomen.   Vitals were stable.   The Lt. lower renal pole was localized with ultrasound.   The patient was given 2 % Xylocaine to anesthetize the skin and subcutaneous tissues.  A spinal needle was introduced with intermittent xylocaine injections form the skin down to the kidneys at 11cm depth.  We used he biopsy gun and 5 pieces was obtained via 2 passes.   The pieces were inspected under the Microscope.    The patient was stable.   Acetaminophen 1000mg IV was given.   There was no complication.  The procedure was smooth.   The patient was shifted to the observation one day surgery area for 8 hours of monitoring.  CBC will be done after 4 hours.  The urine color and output will be monitored.   The BP will be monitored closely.   He will be in complete bed rest for 6 hours post biopsy.   Complications: No  Condition: Good  FOLLOWUP: In clinic        Post Kidney Biopsy Orders:     The patient was shifted to the observation one day surgery area for 8 hours of monitoring.  Complete bed rest in supine for 6 hours with a pad below the Lt flank.  Head can be elevated at 40 degrees if she wants to eat.   Bed pan for urine is recommended. So that she won't get out of the bed for the first 6 hours.   CBC will be done after 4 hours. At 18:00.  The urine color and  output will be monitored.   For the first three times she passes urine, a sample will be held in a cup to observe the color change   The BP will be monitored closely. Every 15 min for one hour, then hourly.   No bleeding was noticed during the procedure              Impression and plan:  Proteinuria with kidney biopsy performed today.   Primary HTN will need monitoring at home.  DM II will need premeal blood sugar monitoring.   IgG monoclonal gammopathy awaiting bone marrow and kidney biopsy.  Obesity. With BMI 35.6. kg/m2.      Post kidney biopsy Patient's Care Instructions    After your kidney biopsy today, you might feel heaviness or discomfort at the site. This sensation might take 1-2 days. If the pain gets moderate, please take Tylenol 650mg tablet.   If there is blood in the urine or sever pain, please return to the emergency department.   Try to avoid any unnecessary activity and rest for the day.  Regular daily activities can be resumed tomorrow.   It is advisable to increase water intake for the coming 24 hours.  Strenuous muscular and physician activity need to be avoided for 2 weeks.   The dressing can be removed after 48 hours.   You can take a shower after 48 hours.   If you have any question, please do not hesitate to call.       JACKY WELLS.Harshil. MD. CANDICE HERRERA.  , [employer] [facility] ([place]).  Nephrology Consultant. [facility].   [address] [place] [address]. [address]th [place] [address].    email: [email or web address].  Tel: Office: [phone]            Nancy WELLS. MD. MATTHEW. SHARON.  , [employer] / [facility] ([facility]).  Nephrology Consultant. [facility].   [facility]. 5th [place] [address].     email: [email or web address].  Tel: Office: [phone]

## 2024-08-06 NOTE — DISCUSSION/SUMMARY
[de-identified] : Patient is experiencing pain during today's visit. Discussed treatment options, the patient would like to follow through with right knee aspiration, bilateral CSI injections, and future bilateral HAO injections. Aspirated 7cc of clear synovial fluid from right knee using ultrasound for proper needle placement. Patient has tried physical therapy, anti-inflammatories, rest, with no improvement. Let this note serve as a letter of medical necessity for authorization of bilateral hyaluronic acid visco injection(s). Follow up after authorization.     ULTRASOUND GUIDED ASPIRATION - PROCEDURE Risks and benefits of aspiration were explained to the patient including but not limited to infection, recurrent hemarthrosis, pain at needle site, and recurrence of effusion.  The knee was prepped in the usual sterile fashion and under sterile condition injection of 3 cc of lidocaine was injected into the subcutaneous tissue.  Followed by aspiration using an 18 gauge needle.   The patient was instructed to rest, ice, and place compression on the knee for at least 24 hours.   There were also instructed to call for fevers drainage, increasing pain or any other concerns.     Left Knee Ultrasound Guided aspiration/steroid injection procedure note: Patient Identification Name/: Verbal with patient and/or family   Procedure Verification: Procedure confirmed with patient or family/designee Consent for procedure: Verbal Consent Given Relevant documentation completed, reviewed, and signed Clinical indications for procedure confirmed  Time-out with all members of procedure team immediately prior to procedure: Correct patient identified. Agreement on procedure. Correct side and site.  KNEE INTRAARTICULAR INJECTION - LEFT After verbal consent and identification of the correct patient and correct site, the LEFT superolateral knee was prepped using alcohol swabs and betadine. This was allowed time to air dry.  A mixture of Kenalog,  Bupicacaine  was injected into the left knee using a sterile 22G 1.5 inch needle.  The patient tolerated the procedure well.  A sterile dressing was placed.  After-care instructions were provided and included instructions to ice the area and to call if redness, pain, or fever develop.     ----------------------------------------------- Home Exercise The patient is instructed on a home exercise program.   TY LOZOYA Acting as a Scribe for Dr. Keshia HILL, Ty Lozoya, attest that this documentation has been prepared under the direction and in the presence of Provider Dr. Schmitt.   Activity Modification The patient was advised to modify their activities.   Dx / Natural History The patient was advised of the diagnosis.  The natural history of the pathology was explained in full to the patient in layman's terms.  Several different treatment options were discussed and explained in full to the patient including the risks and benefits of both surgical and non-surgical treatments.  All questions and concerns were answered.   Pain Guide Activities The patient was advised to let pain guide the gradual advancement of activities.   RICE I explained to the patient that rest, ice, compression, and elevation would benefit them.  They may return to activity after follow-up or when they no longer have any pain.   The patient's current medication management of their orthopedic diagnosis was reviewed today: (1) We discussed a comprehensive treatment plan that included possible pharmaceutical management involving the use of prescription strength medications including but not limited to options such as oral Naprosyn 500mg BID, once daily Meloxicam 15 mg, or 500-650 mg Tylenol versus over the counter oral medications and topical prescription NSAID Pennsaid vs over the counter Voltaren gel. (2) There is a moderate risk of morbidity with further treatment, especially from use of prescription strength medications and possible side effects of these medications which include upset stomach with oral medications, skin reactions to topical medications and cardiac/renal issues with long term use. (3) I recommended that the patient follow-up with their medical physician to discuss any significant specific potential issues with long term medication use such as interactions with current medications or with exacerbation of underlying medical comorbidities. (4) The benefits and risks associated with use of injectable, oral or topical, prescription and over the counter anti-inflammatory medications were discussed with the patient. The patient voiced understanding of the risks including but not limited to bleeding, stroke, kidney dysfunction, heart disease, and were referred to the black box warning label for further information.

## 2024-08-06 NOTE — HISTORY OF PRESENT ILLNESS
[de-identified] : The patient is a 58 year  old   {LEFT] hand dominant male who presents today complaining of BL knees   Date of Injury/Onset: 1 years Pain:    At Rest: 2/10  With Activity:  7/10  Mechanism of injury: n/a  This is NOT a Work Related Injury being treated under Worker's Compensation. This is NOT an athletic injury occurring associated with an interscholastic or organized sports team. Quality of symptoms: sharp, aching, swelling  Improves with: CSI, ice/ heat, voltaren Worse with: stairs, prolonged activity, bending  Treatment/Imaging/Studies Since Last Visit: n/a 	Reports Available For Review Today: _ Out of work/sport: _, since _ School/Sport/Position/Occupation: retired  Change since last visit: csi 1/16/24, states it helped but pain has worsened again Additional Information: None

## 2024-08-06 NOTE — PHYSICAL EXAM
[de-identified] : Left Knee: medial joint line tenderness +medial Thais's +locking Full ROM  Pain with full extension  Medial buckling

## 2024-08-07 NOTE — HISTORY OF PRESENT ILLNESS
[> 3 months] : more  than 3 months [Acetaminophen] : relieved by acetaminophen [Incontinence] : incontinence [Loss of Dexterity] : loss of dexterity [Urinary Ret.] : urinary retention [FreeTextEntry1] : Neck and lower back pain and gait instability [de-identified] : BRYAN OLIVIER is a 58 year old male presents for initial neurosurgical evaluation of cervical spinal stenosis, lumbar radiculopathy, gait instability, past surgical history includes  ACL reconstruction L knee 1999, patient is a retired .  He mentioned he has had longstanding neck and lower back pain but he states over the last few months it is worsened in nature.  He was seen and evaluated by his primary care about a month ago who had ordered x-ray of the cervical and lumbar spine.  It reveals significant degenerative diseases narrowing at C5-6 in addition there is some narrowing at L5-S1 and sacralization.  He does mention he has a MRI from 2015 that was also ordered from the primary care that showed a disc herniation at C5-6 and concerning lumbar spine stenosis at L3-L4 most consistent most prominent.  He endorses that he has pain in the neck that radiates into the arm he is left-hand dominant and has noticed some difficulty with grasping and grabbing objects he endorses that he has been having more difficulty with balance and coordination.  He denies any falls.  He has tried physical therapy in the past but never improvement per his report.  He has not had any pain management injections.  He has been managing his symptoms with NSAIDs which provide temporary relief but then symptoms are worse he finds it more easier to forward flex

## 2024-08-07 NOTE — DATA REVIEWED
[de-identified] : EXAM: [id number] - MR SPINE CERVICAL - ORDERED BY: RASHID BONE   PROCEDURE DATE: 07/31/2024    INTERPRETATION: MR CERVICAL SPINE  CLINICAL INFORMATION: Neck pain TECHNIQUE: MR CERVICAL SPINE COMPARISON: Cervical spine MRI 10/20/2023.  FINDINGS:  DISC LEVEL EVALUATION:  C1/C2: Spurring at the atlantodental articulation. No central canal narrowing. C2/C3: Tiny central protrusion. Bulky left facet arthrosis. Right uncovertebral spurring. Mild right and moderate to severe left foraminal narrowing. C3/C4: Small central protrusion with right greater than left uncovertebral spurring. Bulky left facet arthrosis with ankylosis. Mild narrowing of the spinal canal. Moderate right and moderate to severe left foraminal narrowing. C4/C5: Diffuse disc bulge asymmetric to the left. Bulky left greater than right uncovertebral spurring. Mild bilateral facet arthrosis. Moderate spinal canal stenosis. Severe left greater than right foraminal narrowing. C5/C6: Diffuse disc bulge with small amount of disc material extruded centrally which indents the cord. Moderate spinal canal stenosis. Severe left greater than right foraminal narrowing. C6/C7: Mild disc bulge. Mild spinal canal stenosis. Moderate to severe right greater than left foraminal narrowing. C7/T1: Broad central protrusion. Bulky left facet arthrosis. Mild to moderate spinal canal stenosis. Mild left foraminal narrowing. T1/T2: Only evaluated in the sagittal plane. Broad central protrusion which narrows the spinal canal and severe right greater than left foraminal narrowing.  ALIGNMENT: Straightening of the normal cervical lordosis. Slight anterolisthesis of C7 on T1. Slight retrolisthesis of C6 on C7. CORD: No cord signal abnormality. MARROW: No fracture. Endplate degenerative changes at C5-C6. Marrow edema at C6-C7. DISCS: Moderate to severe multilevel degenerative changes with loss of disc heights and disc signal. IMAGED BRAIN: Normal. PERIPHERAL/NECK SOFT TISSUES: Normal.  IMPRESSION:  Moderate multilevel cervical spondylosis, most notable at C5-C6 with impingement of the cord by extruded disc material and severe left greater than right foraminal narrowing. Additional levels of significant spinal canal stenosis and foraminal narrowing. Similar to prior.  [de-identified] : EXAM: [id number] - MR SPINE LUMBAR - ORDERED BY: RASHID BONE   PROCEDURE DATE: 07/31/2024    INTERPRETATION: CLINICAL INFORMATION: Low back pain  ADDITIONAL CLINICAL INFORMATION: Other Condition see Clinical Info  TECHNIQUE: Multiplanar, multisequence MRI was performed of the lumbar spine. IV Contrast: None  PRIOR STUDIES: Lumbar spine MRI 10/20/2023.  FINDINGS:  BONES: No fracture. Endplate degenerative changes at L1-L2, L2 on L3 and L3-4. Extensive endplate marrow edema at L1-L2. ALIGNMENT: Mild broad dextrocurvature. Slight retrolisthesis of T12 on L1, L1 on L2, L2 on L3 and L5 on S1. Mild right lateral subluxation of L2 on L3. SACROILIAC JOINTS/SACRUM: There is no sacral fracture. The SI joints are partially visualized but are intact. CONUS AND CAUDA EQUINA: The distal cord and conus are normal in signal. Conus terminates at L1. VISUALIZED INTRAPELVIC/INTRA-ABDOMINAL SOFT TISSUES: Normal. PARASPINAL SOFT TISSUES: Paraspinal muscle atrophy. Mild prominence of the epidural fat.   INDIVIDUAL LEVELS: Multilevel loss of disc height and disc signal, severe at L3-L4.  LOWER THORACIC SPINE: Mild disc herniations at T10-T11 and T11-T12 minimally narrowing the spinal canal without significant foraminal narrowing.  T12/L1: Diffuse disc bulge. Mild to moderate spinal canal stenosis. Mild right greater than left foraminal narrowing. Likely contact on the right T12 exiting nerve. L1-L2: Diffuse disc bulge. Mild bilateral facet arthrosis. Complete effacement of the CSF. Moderate spinal canal narrowing. Moderate right and moderate to severe left foraminal narrowing. L2-L3: Diffuse disc bulge. Mild bilateral facet arthrosis. Near-complete effacement of the CSF. Mild to moderate spinal canal stenosis. Mild right and moderate to severe left foraminal narrowing. L3-L4: Diffuse disc bulge with osseous ridging asymmetric to the right. Bulky right and mild left facet arthrosis. Mild spinal canal stenosis. Moderate to severe right greater than left foraminal narrowing. Narrowing of the right lateral recess. Contact on the right L3 exiting nerve. L4-L5: Mild disc bulge. Moderate to severe right and moderate left facet arthrosis. Mild spinal canal stenosis. Mild to moderate bilateral foraminal narrowing. L5-S1: Diffuse disc bulge which contacts the L5 exiting nerves. Moderate bilateral facet arthrosis. Mild narrowing of the spinal canal. Severe bilateral foraminal narrowing.   IMPRESSION:  Moderate to severe multilevel lumbar spondylosis in the setting of epidural lipomatosis with multilevel spinal canal and foraminal narrowing. Similar to prior.

## 2024-08-07 NOTE — HISTORY OF PRESENT ILLNESS
[> 3 months] : more  than 3 months [Acetaminophen] : relieved by acetaminophen [Incontinence] : incontinence [Loss of Dexterity] : loss of dexterity [Urinary Ret.] : urinary retention [FreeTextEntry1] : Neck and lower back pain and gait instability [de-identified] : BRYAN OLIVIER is a 58 year old male presents for initial neurosurgical evaluation of cervical spinal stenosis, lumbar radiculopathy, gait instability, past surgical history includes  ACL reconstruction L knee 1999, patient is a retired .  He mentioned he has had longstanding neck and lower back pain but he states over the last few months it is worsened in nature.  He was seen and evaluated by his primary care about a month ago who had ordered x-ray of the cervical and lumbar spine.  It reveals significant degenerative diseases narrowing at C5-6 in addition there is some narrowing at L5-S1 and sacralization.  He does mention he has a MRI from 2015 that was also ordered from the primary care that showed a disc herniation at C5-6 and concerning lumbar spine stenosis at L3-L4 most consistent most prominent.  He endorses that he has pain in the neck that radiates into the arm he is left-hand dominant and has noticed some difficulty with grasping and grabbing objects he endorses that he has been having more difficulty with balance and coordination.  He denies any falls.  He has tried physical therapy in the past but never improvement per his report.  He has not had any pain management injections.  He has been managing his symptoms with NSAIDs which provide temporary relief but then symptoms are worse he finds it more easier to forward flex

## 2024-08-07 NOTE — DATA REVIEWED
[de-identified] : EXAM: [id number] - MR SPINE CERVICAL - ORDERED BY: RASHID BONE   PROCEDURE DATE: 07/31/2024    INTERPRETATION: MR CERVICAL SPINE  CLINICAL INFORMATION: Neck pain TECHNIQUE: MR CERVICAL SPINE COMPARISON: Cervical spine MRI 10/20/2023.  FINDINGS:  DISC LEVEL EVALUATION:  C1/C2: Spurring at the atlantodental articulation. No central canal narrowing. C2/C3: Tiny central protrusion. Bulky left facet arthrosis. Right uncovertebral spurring. Mild right and moderate to severe left foraminal narrowing. C3/C4: Small central protrusion with right greater than left uncovertebral spurring. Bulky left facet arthrosis with ankylosis. Mild narrowing of the spinal canal. Moderate right and moderate to severe left foraminal narrowing. C4/C5: Diffuse disc bulge asymmetric to the left. Bulky left greater than right uncovertebral spurring. Mild bilateral facet arthrosis. Moderate spinal canal stenosis. Severe left greater than right foraminal narrowing. C5/C6: Diffuse disc bulge with small amount of disc material extruded centrally which indents the cord. Moderate spinal canal stenosis. Severe left greater than right foraminal narrowing. C6/C7: Mild disc bulge. Mild spinal canal stenosis. Moderate to severe right greater than left foraminal narrowing. C7/T1: Broad central protrusion. Bulky left facet arthrosis. Mild to moderate spinal canal stenosis. Mild left foraminal narrowing. T1/T2: Only evaluated in the sagittal plane. Broad central protrusion which narrows the spinal canal and severe right greater than left foraminal narrowing.  ALIGNMENT: Straightening of the normal cervical lordosis. Slight anterolisthesis of C7 on T1. Slight retrolisthesis of C6 on C7. CORD: No cord signal abnormality. MARROW: No fracture. Endplate degenerative changes at C5-C6. Marrow edema at C6-C7. DISCS: Moderate to severe multilevel degenerative changes with loss of disc heights and disc signal. IMAGED BRAIN: Normal. PERIPHERAL/NECK SOFT TISSUES: Normal.  IMPRESSION:  Moderate multilevel cervical spondylosis, most notable at C5-C6 with impingement of the cord by extruded disc material and severe left greater than right foraminal narrowing. Additional levels of significant spinal canal stenosis and foraminal narrowing. Similar to prior.  [de-identified] : EXAM: [id number] - MR SPINE LUMBAR - ORDERED BY: RASHID BONE   PROCEDURE DATE: 07/31/2024    INTERPRETATION: CLINICAL INFORMATION: Low back pain  ADDITIONAL CLINICAL INFORMATION: Other Condition see Clinical Info  TECHNIQUE: Multiplanar, multisequence MRI was performed of the lumbar spine. IV Contrast: None  PRIOR STUDIES: Lumbar spine MRI 10/20/2023.  FINDINGS:  BONES: No fracture. Endplate degenerative changes at L1-L2, L2 on L3 and L3-4. Extensive endplate marrow edema at L1-L2. ALIGNMENT: Mild broad dextrocurvature. Slight retrolisthesis of T12 on L1, L1 on L2, L2 on L3 and L5 on S1. Mild right lateral subluxation of L2 on L3. SACROILIAC JOINTS/SACRUM: There is no sacral fracture. The SI joints are partially visualized but are intact. CONUS AND CAUDA EQUINA: The distal cord and conus are normal in signal. Conus terminates at L1. VISUALIZED INTRAPELVIC/INTRA-ABDOMINAL SOFT TISSUES: Normal. PARASPINAL SOFT TISSUES: Paraspinal muscle atrophy. Mild prominence of the epidural fat.   INDIVIDUAL LEVELS: Multilevel loss of disc height and disc signal, severe at L3-L4.  LOWER THORACIC SPINE: Mild disc herniations at T10-T11 and T11-T12 minimally narrowing the spinal canal without significant foraminal narrowing.  T12/L1: Diffuse disc bulge. Mild to moderate spinal canal stenosis. Mild right greater than left foraminal narrowing. Likely contact on the right T12 exiting nerve. L1-L2: Diffuse disc bulge. Mild bilateral facet arthrosis. Complete effacement of the CSF. Moderate spinal canal narrowing. Moderate right and moderate to severe left foraminal narrowing. L2-L3: Diffuse disc bulge. Mild bilateral facet arthrosis. Near-complete effacement of the CSF. Mild to moderate spinal canal stenosis. Mild right and moderate to severe left foraminal narrowing. L3-L4: Diffuse disc bulge with osseous ridging asymmetric to the right. Bulky right and mild left facet arthrosis. Mild spinal canal stenosis. Moderate to severe right greater than left foraminal narrowing. Narrowing of the right lateral recess. Contact on the right L3 exiting nerve. L4-L5: Mild disc bulge. Moderate to severe right and moderate left facet arthrosis. Mild spinal canal stenosis. Mild to moderate bilateral foraminal narrowing. L5-S1: Diffuse disc bulge which contacts the L5 exiting nerves. Moderate bilateral facet arthrosis. Mild narrowing of the spinal canal. Severe bilateral foraminal narrowing.   IMPRESSION:  Moderate to severe multilevel lumbar spondylosis in the setting of epidural lipomatosis with multilevel spinal canal and foraminal narrowing. Similar to prior.

## 2024-08-07 NOTE — ASSESSMENT
[FreeTextEntry1] : Mr. Briscoe presents with above history and imaging.  Patient is neurologically intact but is exhibiting early signs of myelopathy.  I have personally reviewed his MRI cervical spine that reveals C5- C6 stenosis with cord compression.  In addition, multilevel lumbar spinal stenosis Patient knows  he may be an appropriate candidate for ACDF C5-6.- he wishes to continue  PT Patient endorses his LE symptoms are not improving despite PT and 2 LUCILLE's directed to lumbar spine His complaints are more referable to his lumbar spine but his radiographic findings pertaining to cervical spine are more impressive. Plan: Patient wishes to proceed with radiofrequency ablation of lower back prior to consideration of surgery.  Antiinflammatory was recommended for management of symptoms and pain. Patient has been referred to physical therapy for strengthening and to decrease pain modalities and core strengthening.  We discussed the benefits of alternating heat, ice  and Icy Hot Cream.  Patient  may try gentle stretches at home in the interim.  Patient will follow up in 4-6 weeks for a formal clinical assessment and evaluate recovery. Patient has been given an opportunity to ask and have their questions answered to their satisfaction. Patient knows to call the office if there are any new or worsening symptoms.   I, Dr. Jose Hobson, personally performed the evaluation and management (E/M) services for this established patient who presents today. That E/M includes conducting the clinically appropriate interval history &/or exam and establishing a continued plan of care. Today, my HORACIO, Katy Brizuela, was here to observe my evaluation and management service for this patient and follow the plan of care established by me going forward.

## 2024-08-12 ENCOUNTER — APPOINTMENT (OUTPATIENT)
Dept: PHYSICAL MEDICINE AND REHAB | Facility: CLINIC | Age: 59
End: 2024-08-12
Payer: COMMERCIAL

## 2024-08-12 VITALS
DIASTOLIC BLOOD PRESSURE: 98 MMHG | RESPIRATION RATE: 15 BRPM | WEIGHT: 240 LBS | SYSTOLIC BLOOD PRESSURE: 145 MMHG | BODY MASS INDEX: 30.8 KG/M2 | HEIGHT: 74 IN | HEART RATE: 85 BPM

## 2024-08-12 DIAGNOSIS — M48.02 SPINAL STENOSIS, CERVICAL REGION: ICD-10-CM

## 2024-08-12 PROCEDURE — G2211 COMPLEX E/M VISIT ADD ON: CPT | Mod: NC

## 2024-08-12 PROCEDURE — 99214 OFFICE O/P EST MOD 30 MIN: CPT

## 2024-08-12 NOTE — DATA REVIEWED
[FreeTextEntry1] : MR L Spine 7/31/24 reviewed and interpreted by me: multilevel spondylosis seen  EXAM: [id number] - MR SPINE LUMBAR  - ORDERED BY:  RASHID BONE   PROCEDURE DATE:  07/31/2024    INTERPRETATION:  CLINICAL INFORMATION: Low back pain  ADDITIONAL CLINICAL INFORMATION: Other Condition see Clinical Info  TECHNIQUE: Multiplanar, multisequence MRI was performed of the lumbar spine. IV Contrast: None  PRIOR STUDIES: Lumbar spine MRI 10/20/2023.  FINDINGS:  BONES: No fracture. Endplate degenerative changes at L1-L2, L2 on L3 and L3-4. Extensive endplate marrow edema at L1-L2. ALIGNMENT: Mild broad dextrocurvature. Slight retrolisthesis of T12 on L1, L1 on L2, L2 on L3 and L5 on S1. Mild right lateral subluxation of L2 on L3. SACROILIAC JOINTS/SACRUM: There is no sacral fracture. The SI joints are partially visualized but are intact. CONUS AND CAUDA EQUINA: The distal cord and conus are normal in signal. Conus terminates at L1. VISUALIZED INTRAPELVIC/INTRA-ABDOMINAL SOFT TISSUES: Normal. PARASPINAL SOFT TISSUES: Paraspinal muscle atrophy. Mild prominence of the epidural fat.   INDIVIDUAL LEVELS: Multilevel loss of disc height and disc signal, severe at L3-L4.  LOWER THORACIC SPINE: Mild disc herniations at T10-T11 and T11-T12 minimally narrowing the spinal canal without significant foraminal narrowing.  T12/L1: Diffuse disc bulge. Mild to moderate spinal canal stenosis. Mild right greater than left foraminal narrowing. Likely contact on the right T12 exiting nerve. L1-L2: Diffuse disc bulge. Mild bilateral facet arthrosis. Complete effacement of the CSF. Moderate spinal canal narrowing. Moderate right and moderate to severe left foraminal narrowing. L2-L3: Diffuse disc bulge. Mild bilateral facet arthrosis. Near-complete effacement of the CSF. Mild to moderate spinal canal stenosis. Mild right and moderate to severe left foraminal narrowing. L3-L4: Diffuse disc bulge with osseous ridging asymmetric to the right. Bulky right and mild left facet arthrosis. Mild spinal canal stenosis. Moderate to severe right greater than left foraminal narrowing. Narrowing of the right lateral recess. Contact on the right L3 exiting nerve. L4-L5: Mild disc bulge. Moderate to severe right and moderate left facet arthrosis. Mild spinal canal stenosis. Mild to moderate bilateral foraminal narrowing. L5-S1: Diffuse disc bulge which contacts the L5 exiting nerves. Moderate bilateral facet arthrosis. Mild narrowing of the spinal canal. Severe bilateral foraminal narrowing.   IMPRESSION:  Moderate to severe multilevel lumbar spondylosis in the setting of epidural lipomatosis with multilevel spinal canal and foraminal narrowing. Similar to prior.  --- End of Report ---       SANGEETA HODGE MD; Attending Radiologist This document has been electronically signed. Aug  3 2024

## 2024-08-12 NOTE — HISTORY OF PRESENT ILLNESS
[FreeTextEntry1] : Mr. BRYAN OLIVIER is a 59 year old male who presents for follow up. At last visit, he was ordered a repeat S1 TFESi, was going to f/u with neurosurgery for his cervical stenosis, was restarted on PT and continued on Tylenol PRN. He underwent the LUCILLE on 6/6/24. Patient saw neurosurgery 8/6/24 who stated he is a candidate for ACDF but he would like to defer for now. He is now complaining of L>>R low back pain with some referring pain down proximal LLE.    Location: Both neck and low back, but worst in low back Onset: Chronic for years but worsening since Fall 2023, no inciting events Provocation/Palliative: Worst with activity, better with rest Quality:Shooting Radiation: Down LUE, occasionally down RLE with walking and standing.  Severity: Moderate to severe Timing: Comes and goes  No bowel/bladder changes. No groin numbness.

## 2024-08-12 NOTE — ASSESSMENT
[FreeTextEntry1] : Mr. BRYAN OLIVIER is a 59 year old male who presents with chronic neck and low back pain, worst in the low back, likely due to underlying spondylosis. Patient has significant spondylosis seen on MRI. He got significant but temporary relief from recent LUCILLE x2. Denies any red flag signs. Will recommend: - Discussed with patient the risks (including but not limited to bleeding, infection, nerve damage, etc), benefits and alternatives to a medial branch block injection for which patient understands. Will plan on a bilateral L4-5 and L5-S1 MBB.  - Patient to f/u with neurosurgery regarding his significant cervical stenosis - He will continue on occasional Tylenol  Return for procedure. Patient aware of red flag signs including any changes to their bowel/bladder control, groin numbness or new weakness. Patient knows to seek immediate attention by calling 911 or going to nearest ER if these symptoms appear.  This patient is being managed for a complex chronic pain that requires ongoing medical management. The nature of this condition requires a longitudinal relationship and monitoring over time for appropriate treatment.

## 2024-08-22 ENCOUNTER — APPOINTMENT (OUTPATIENT)
Dept: PHYSICAL MEDICINE AND REHAB | Facility: AMBULATORY SURGERY CENTER | Age: 59
End: 2024-08-22
Payer: COMMERCIAL

## 2024-08-22 DIAGNOSIS — M47.816 SPONDYLOSIS W/OUT MYELOPATHY OR RADICULOPATHY, LUMBAR REGION: ICD-10-CM

## 2024-08-22 PROCEDURE — 64494 INJ PARAVERT F JNT L/S 2 LEV: CPT | Mod: 50

## 2024-08-22 PROCEDURE — 64493 INJ PARAVERT F JNT L/S 1 LEV: CPT | Mod: 50

## 2024-08-22 NOTE — PROCEDURE
[de-identified] : Lumbar Medial Branch Block Under Fluoroscopic Guidance   Attending: Irwin Henriquez DO  PREOPERATIVE DIAGNOSIS: Lumbar Facet Joint Pain POSTOPERATIVE DIAGNOSIS: same  SEDATION: As per Anesthesia   PROCEDURE PERFORMED:  Lumbar Medial Branch Block at the bilateral L4-5 and L5-S1 levels   ESTIMATED BLOOD LOSS:  None FLUOROSCOPY WAS USED.    PROCEDURE AND FINDINGS:   Patient was greeted in the pre-procedure holding area. The risk, benefits and alternatives to the procedure were again reviewed with the patient and written informed consent was placed in the chart. They were taken to the procedure room and positioned prone on the fluoroscopy table.  Prior to the procedure a time out was completed, verifying correct patient, procedure, and site.     An AP fluoroscopic  film was taken to identify the vertebral bodies, pedicles, transverse processes and superior articulating processes of interest. The skin was prepped with chlorhexidine and draped in the usual sterile fashion. The skin and subcutaneous tissue overlying the aforementioned anatomic targets were anesthetized using a 27-gauge 1-1/2 inch needle with 1% preservative-free lidocaine for a total volume of 6 mls.     Then a 25-gauge 3.5 inch Quincke spinal needle was advanced under fluoroscopic guidance to the junction of the superior articular process and transverse process of the levels of interest (and at the junction of the sacral ala and superior articular process for the L5 dorsal ramus). After negative aspiration, 0.5 mls of contrast was injected under AP view at each level. There was no evidence of intravascular uptake or intrathecal spread on imaging.    At this point, after negative aspiration, a solution of 0.5ml of Lidocaine 2% was injected at each level without incident. The needles were flushed with a small amount of contrast and removed. The needle insertion site was dressed appropriately.   Patient was taken to the recovery room where they were monitored for a brief period of time. They tolerated the procedure well and were discharged home in stable condition with post procedural instructions.

## 2024-08-27 ENCOUNTER — APPOINTMENT (OUTPATIENT)
Dept: ORTHOPEDIC SURGERY | Facility: CLINIC | Age: 59
End: 2024-08-27
Payer: COMMERCIAL

## 2024-08-27 VITALS — BODY MASS INDEX: 30.8 KG/M2 | HEIGHT: 74 IN | WEIGHT: 240 LBS

## 2024-08-27 DIAGNOSIS — M79.18 MYALGIA, OTHER SITE: ICD-10-CM

## 2024-08-27 DIAGNOSIS — S89.92XA UNSPECIFIED INJURY OF LEFT LOWER LEG, INITIAL ENCOUNTER: ICD-10-CM

## 2024-08-27 DIAGNOSIS — M25.562 PAIN IN LEFT KNEE: ICD-10-CM

## 2024-08-27 DIAGNOSIS — M25.562 PAIN IN RIGHT KNEE: ICD-10-CM

## 2024-08-27 DIAGNOSIS — S89.91XA UNSPECIFIED INJURY OF RIGHT LOWER LEG, INITIAL ENCOUNTER: ICD-10-CM

## 2024-08-27 DIAGNOSIS — M25.561 PAIN IN RIGHT KNEE: ICD-10-CM

## 2024-08-27 PROCEDURE — 99214 OFFICE O/P EST MOD 30 MIN: CPT

## 2024-08-27 NOTE — PHYSICAL EXAM
[de-identified] : Left Knee: medial joint line tenderness +medial Thais's +locking Full ROM  Pain with full extension  Medial buckling

## 2024-08-27 NOTE — ADDENDUM
[FreeTextEntry1] : Documented by Yousuf Ca acting as a scribe for Dr. Schmitt and Raudel Welsh PA-C on 08/27/2024 and was presence for the following sections: Physical Exam; Data Reviewed; Assessment; Discussion/Summary. All medical record entries made by the Scribe were at my, Dr. Schmitt, and Raudel Welsh, direction and personally dictated by me on 08/27/2024. I have reviewed the chart and agree that the record accurately reflects my personal performance of the history, physical exam, procedure and imaging.

## 2024-08-27 NOTE — HISTORY OF PRESENT ILLNESS
[de-identified] : The patient is a 58 year  old    {LEFT] hand dominant male who presents today complaining of BL knees   Date of Injury/Onset: 1 years Pain:    At Rest: 2/10  With Activity:  7/10  Mechanism of injury: n/a  This is NOT a Work Related Injury being treated under Worker's Compensation. This is NOT an athletic injury occurring associated with an interscholastic or organized sports team. Quality of symptoms: sharp, aching, swelling  Improves with: CSI, ice/ heat, voltaren Worse with: stairs, prolonged activity, bending  Treatment/Imaging/Studies Since Last Visit: CSI  	Reports Available For Review Today: _ Out of work/sport: _, since _ School/Sport/Position/Occupation: retired  Change since last visit: CSI/ BL knees 8/6/24- states it helped significantly on the left knee, right knee persisting pain  Additional Information: None

## 2024-08-27 NOTE — DISCUSSION/SUMMARY
[de-identified] : Patient notes that pain has not improved with physical therapy or cortisone injections. Patient approved for hyaluronic injections. CT arthrogram of right knee to eval patellar chondromalacia follow up for HAO injection and CT arthrogram review.      ----------------------------------------------- Home Exercise The patient is instructed on a home exercise program.  IVETT JENSEN Acting as a Scribe for Dr. Keshia HILL, Ivett Jensen, attest that this documentation has been prepared under the direction and in the presence of Provider Dr. Schmitt.  Activity Modification The patient was advised to modify their activities.  Dx / Natural History The patient was advised of the diagnosis. The natural history of the pathology was explained in full to the patient in layman's terms. Several different treatment options were discussed and explained in full to the patient including the risks and benefits of both surgical and non-surgical treatments.  All questions and concerns were answered.  Pain Guide Activities The patient was advised to let pain guide the gradual advancement of activities.  RICE I explained to the patient that rest, ice, compression, and elevation would benefit them. They may return to activity after follow-up or when they no longer have any pain.  The patient's current medication management of their orthopedic diagnosis was reviewed today: (1) We discussed a comprehensive treatment plan that included possible pharmaceutical management involving the use of prescription strength medications including but not limited to options such as oral Naprosyn 500mg BID, once daily Meloxicam 15 mg, or 500-650 mg Tylenol versus over the counter oral medications and topical prescription NSAID Pennsaid vs over the counter Voltaren gel. (2) There is a moderate risk of morbidity with further treatment, especially from use of prescription strength medications and possible side effects of these medications which include upset stomach with oral medications, skin reactions to topical medications and cardiac/renal issues with long term use. (3) I recommended that the patient follow-up with their medical physician to discuss any significant specific potential issues with long term medication use such as interactions with current medications or with exacerbation of underlying medical comorbidities. (4) The benefits and risks associated with use of injectable, oral or topical, prescription and over the counter anti-inflammatory medications were discussed with the patient. The patient voiced understanding of the risks including but not limited to bleeding, stroke, kidney dysfunction, heart disease, and were referred to the black box warning label for further information.

## 2024-09-04 ENCOUNTER — APPOINTMENT (OUTPATIENT)
Dept: PHYSICAL MEDICINE AND REHAB | Facility: CLINIC | Age: 59
End: 2024-09-04

## 2024-09-09 ENCOUNTER — APPOINTMENT (OUTPATIENT)
Dept: PHYSICAL MEDICINE AND REHAB | Facility: CLINIC | Age: 59
End: 2024-09-09
Payer: COMMERCIAL

## 2024-09-09 VITALS
SYSTOLIC BLOOD PRESSURE: 146 MMHG | DIASTOLIC BLOOD PRESSURE: 90 MMHG | RESPIRATION RATE: 15 BRPM | BODY MASS INDEX: 30.8 KG/M2 | WEIGHT: 240 LBS | HEIGHT: 74 IN

## 2024-09-09 DIAGNOSIS — M48.02 SPINAL STENOSIS, CERVICAL REGION: ICD-10-CM

## 2024-09-09 PROCEDURE — 99214 OFFICE O/P EST MOD 30 MIN: CPT

## 2024-09-09 PROCEDURE — G2211 COMPLEX E/M VISIT ADD ON: CPT | Mod: NC

## 2024-09-09 NOTE — ASSESSMENT
[FreeTextEntry1] : Mr. BRYAN OLIVIER is a 59 year old male who presents with chronic neck and low back pain, worst in the low back, likely due to underlying spondylosis. Patient has significant spondylosis seen on MRI. He got significant but temporary relief from recent LUCILLE x2. Denies any red flag signs. Will recommend: - Given that he received 95% relief from his previous bilateral L4-5 and L5-S1 MBB on 8/22/24 for around 6 hours, we discussed with patient the risks (including but not limited to bleeding, infection, nerve damage, etc), benefits and alternatives to a repeat medial branch block injection for which patient understands. Will plan on a repeat bilateral L4-5 and L5-S1 MBB with a plan for a radiofrequency ablation if this block is also successful.  - Patient to f/u with neurosurgery regarding his significant cervical stenosis - He will continue on occasional Tylenol  Return for procedure. Patient aware of red flag signs including any changes to their bowel/bladder control, groin numbness or new weakness. Patient knows to seek immediate attention by calling 911 or going to nearest ER if these symptoms appear.  This patient is being managed for a complex chronic pain that requires ongoing medical management. The nature of this condition requires a longitudinal relationship and monitoring over time for appropriate treatment.

## 2024-09-09 NOTE — HISTORY OF PRESENT ILLNESS
[FreeTextEntry1] : Mr. BRYAN OLIVIER is a 59 year old male who presents for follow up. At last visit, he was ordered a bilateral L4-5 and L5-S1 MBB, told to f/u with neurosurgery and continued on occasional Tylenol. He underwent the MBB on 8/22/24. He received 95% relief for about 6 hours after the procedure on both sides of his low back. Does not complain of any side effects.    Location: Both neck and low back, but worst in low back Onset: Chronic for years but worsening since Fall 2023, no inciting events Provocation/Palliative: Worst with activity, better with rest Quality:Shooting Radiation: Down LUE, occasionally down RLE with walking and standing. Severity: Moderate to severe Timing: Comes and goes  No bowel/bladder changes. No groin numbness.

## 2024-09-09 NOTE — PHYSICAL EXAM
[FreeTextEntry1] : PE: Constitutional: In NAD, calm and cooperative MSK (Neck and Back)  Inspection: no gross swelling identified, no swelling or erythema around injection site  Palpation: Tenderness of the bilateral lower cervical and lumbar paraspinals  ROM: Pain at end cervical extension/flexion and with lumbar extension, no pain with lumbar flexion  Strength: 5/5 strength in bilateral upper and lower extremities  Reflexes: 2+ Biceps/Brachioradialis/Triceps/patella/Achilles reflex bilaterally, Hoffmans/Clonus negative bilaterally  Sensation: Intact to light touch in bilateral upper and lower extremities  Special tests: Spurlings test negative bilaterally, SLR negative bilaterally, SAGAR/FADIR negative bilaterally, lumbar facet loading positive bilaterally.

## 2024-09-10 ENCOUNTER — APPOINTMENT (OUTPATIENT)
Dept: ORTHOPEDIC SURGERY | Facility: CLINIC | Age: 59
End: 2024-09-10
Payer: COMMERCIAL

## 2024-09-10 VITALS — WEIGHT: 240 LBS | HEIGHT: 74 IN | BODY MASS INDEX: 30.8 KG/M2

## 2024-09-10 DIAGNOSIS — M25.562 PAIN IN LEFT KNEE: ICD-10-CM

## 2024-09-10 DIAGNOSIS — M25.562 PAIN IN RIGHT KNEE: ICD-10-CM

## 2024-09-10 DIAGNOSIS — M25.561 PAIN IN RIGHT KNEE: ICD-10-CM

## 2024-09-10 DIAGNOSIS — M79.18 MYALGIA, OTHER SITE: ICD-10-CM

## 2024-09-10 PROCEDURE — 99213 OFFICE O/P EST LOW 20 MIN: CPT | Mod: 25

## 2024-09-10 PROCEDURE — 20611 DRAIN/INJ JOINT/BURSA W/US: CPT | Mod: LT

## 2024-09-10 PROCEDURE — 20610 DRAIN/INJ JOINT/BURSA W/O US: CPT | Mod: RT

## 2024-09-10 PROCEDURE — 99214 OFFICE O/P EST MOD 30 MIN: CPT | Mod: 25

## 2024-09-11 NOTE — PHYSICAL EXAM
[de-identified] : Left Knee: medial joint line tenderness +medial Thais's +locking Full ROM  Pain with full extension  Medial buckling

## 2024-09-11 NOTE — DISCUSSION/SUMMARY
[de-identified] : RB&A to Hyaluronic Acid Injection discussed. All questions were answered. Patient wishes to move forward with injection today. Aspirated 14cc of clear synovial fluid from right knee using ultrasound for proper needle placement. Patient will follow up as needed.      BILATERAL KNEES MONOVISC INJECTION - ULTRASOUND GUIDED Patient Identification Name/: Verbal with patient and/or family Procedure Verification: Procedure confirmed with patient or family/designee Consent for procedure: Verbal Consent Given Relevant documentation completed, reviewed, and signed Clinical indications for procedure confirmed Time-out with all members of procedure team immediately prior to procedure: Correct patient identified. Agreement on procedure. Correct side and site.   KNEE INTRAARTICULAR INJECTION - BILATERAL After verbal consent and identification of the correct patient and correct site, the BILATERAL knees were prepped using alcohol swabs and betadine. This was allowed time to air dry. The pre-loaded MONOVISC was injected into the BILATERAL KNEES via the lateral knee portal with a 1-Inch 22G needle. Ultrasound was used to ensure proper needle placement. The patient tolerated the procedure well. Sterile dressings were placed. After-care instructions were provided and included instructions to ice the area and to call if redness, pain, or fever develop.       ULTRASOUND GUIDED ASPIRATION - PROCEDURE Risks and benefits of aspiration were explained to the patient including but not limited to infection, recurrent hemarthrosis, pain at needle site, and recurrence of effusion. The knee was prepped in the usual sterile fashion and under sterile condition injection of 3 cc of lidocaine was injected into the subcutaneous tissue. Followed by aspiration using an 18 gauge needle. The patient was instructed to rest, ice, and place compression on the knee for at least 24 hours. There were also instructed to call for fevers drainage, increasing pain or any other concerns.      ----------------------------------------------- Home Exercise The patient is instructed on a home exercise program.  IVETT JENSEN Acting as a Scribe for Dr. Keshia HILL, Ivett Jensen, attest that this documentation has been prepared under the direction and in the presence of Provider Dr. Schmitt.  Activity Modification The patient was advised to modify their activities.  Dx / Natural History The patient was advised of the diagnosis. The natural history of the pathology was explained in full to the patient in layman's terms. Several different treatment options were discussed and explained in full to the patient including the risks and benefits of both surgical and non-surgical treatments.  All questions and concerns were answered.  Pain Guide Activities The patient was advised to let pain guide the gradual advancement of activities.  RICE I explained to the patient that rest, ice, compression, and elevation would benefit them. They may return to activity after follow-up or when they no longer have any pain.  The patient's current medication management of their orthopedic diagnosis was reviewed today: (1) We discussed a comprehensive treatment plan that included possible pharmaceutical management involving the use of prescription strength medications including but not limited to options such as oral Naprosyn 500mg BID, once daily Meloxicam 15 mg, or 500-650 mg Tylenol versus over the counter oral medications and topical prescription NSAID Pennsaid vs over the counter Voltaren gel. (2) There is a moderate risk of morbidity with further treatment, especially from use of prescription strength medications and possible side effects of these medications which include upset stomach with oral medications, skin reactions to topical medications and cardiac/renal issues with long term use. (3) I recommended that the patient follow-up with their medical physician to discuss any significant specific potential issues with long term medication use such as interactions with current medications or with exacerbation of underlying medical comorbidities. (4) The benefits and risks associated with use of injectable, oral or topical, prescription and over the counter anti-inflammatory medications were discussed with the patient. The patient voiced understanding of the risks including but not limited to bleeding, stroke, kidney dysfunction, heart disease, and were referred to the black box warning label for further information.

## 2024-09-11 NOTE — DISCUSSION/SUMMARY
[de-identified] : RB&A to Hyaluronic Acid Injection discussed. All questions were answered. Patient wishes to move forward with injection today. Aspirated 14cc of clear synovial fluid from right knee using ultrasound for proper needle placement. Patient will follow up as needed.      BILATERAL KNEES MONOVISC INJECTION - ULTRASOUND GUIDED Patient Identification Name/: Verbal with patient and/or family Procedure Verification: Procedure confirmed with patient or family/designee Consent for procedure: Verbal Consent Given Relevant documentation completed, reviewed, and signed Clinical indications for procedure confirmed Time-out with all members of procedure team immediately prior to procedure: Correct patient identified. Agreement on procedure. Correct side and site.   KNEE INTRAARTICULAR INJECTION - BILATERAL After verbal consent and identification of the correct patient and correct site, the BILATERAL knees were prepped using alcohol swabs and betadine. This was allowed time to air dry. The pre-loaded MONOVISC was injected into the BILATERAL KNEES via the lateral knee portal with a 1-Inch 22G needle. Ultrasound was used to ensure proper needle placement. The patient tolerated the procedure well. Sterile dressings were placed. After-care instructions were provided and included instructions to ice the area and to call if redness, pain, or fever develop.       ULTRASOUND GUIDED ASPIRATION - PROCEDURE Risks and benefits of aspiration were explained to the patient including but not limited to infection, recurrent hemarthrosis, pain at needle site, and recurrence of effusion. The knee was prepped in the usual sterile fashion and under sterile condition injection of 3 cc of lidocaine was injected into the subcutaneous tissue. Followed by aspiration using an 18 gauge needle. The patient was instructed to rest, ice, and place compression on the knee for at least 24 hours. There were also instructed to call for fevers drainage, increasing pain or any other concerns.      ----------------------------------------------- Home Exercise The patient is instructed on a home exercise program.  IVETT JENSEN Acting as a Scribe for Dr. Keshia HILL, Ivett Jensen, attest that this documentation has been prepared under the direction and in the presence of Provider Dr. Schmitt.  Activity Modification The patient was advised to modify their activities.  Dx / Natural History The patient was advised of the diagnosis. The natural history of the pathology was explained in full to the patient in layman's terms. Several different treatment options were discussed and explained in full to the patient including the risks and benefits of both surgical and non-surgical treatments.  All questions and concerns were answered.  Pain Guide Activities The patient was advised to let pain guide the gradual advancement of activities.  RICE I explained to the patient that rest, ice, compression, and elevation would benefit them. They may return to activity after follow-up or when they no longer have any pain.  The patient's current medication management of their orthopedic diagnosis was reviewed today: (1) We discussed a comprehensive treatment plan that included possible pharmaceutical management involving the use of prescription strength medications including but not limited to options such as oral Naprosyn 500mg BID, once daily Meloxicam 15 mg, or 500-650 mg Tylenol versus over the counter oral medications and topical prescription NSAID Pennsaid vs over the counter Voltaren gel. (2) There is a moderate risk of morbidity with further treatment, especially from use of prescription strength medications and possible side effects of these medications which include upset stomach with oral medications, skin reactions to topical medications and cardiac/renal issues with long term use. (3) I recommended that the patient follow-up with their medical physician to discuss any significant specific potential issues with long term medication use such as interactions with current medications or with exacerbation of underlying medical comorbidities. (4) The benefits and risks associated with use of injectable, oral or topical, prescription and over the counter anti-inflammatory medications were discussed with the patient. The patient voiced understanding of the risks including but not limited to bleeding, stroke, kidney dysfunction, heart disease, and were referred to the black box warning label for further information.

## 2024-09-11 NOTE — PHYSICAL EXAM
[de-identified] : Left Knee: medial joint line tenderness +medial Thais's +locking Full ROM  Pain with full extension  Medial buckling

## 2024-09-11 NOTE — ADDENDUM
[FreeTextEntry1] : Documented by Yousuf Ca acting as a scribe for Dr. Schmitt and Raudel Welsh PA-C on 09/10/2024 and was presence for the following sections: Physical Exam; Data Reviewed; Assessment; Discussion/Summary. All medical record entries made by the Scribe were at my, Dr. Schmitt, and Raudel Welsh, direction and personally dictated by me on 09/10/2024. I have reviewed the chart and agree that the record accurately reflects my personal performance of the history, physical exam, procedure and imaging.

## 2024-09-17 ENCOUNTER — APPOINTMENT (OUTPATIENT)
Dept: ORTHOPEDIC SURGERY | Facility: CLINIC | Age: 59
End: 2024-09-17

## 2024-09-19 ENCOUNTER — APPOINTMENT (OUTPATIENT)
Dept: PHYSICAL MEDICINE AND REHAB | Facility: AMBULATORY SURGERY CENTER | Age: 59
End: 2024-09-19
Payer: COMMERCIAL

## 2024-09-19 PROCEDURE — 64494 INJ PARAVERT F JNT L/S 2 LEV: CPT | Mod: 50

## 2024-09-19 PROCEDURE — 64493 INJ PARAVERT F JNT L/S 1 LEV: CPT | Mod: 50

## 2024-09-19 NOTE — PROCEDURE
[de-identified] : Lumbar Medial Branch Block Under Fluoroscopic Guidance   Attending: Irwin Henriquez DO  PREOPERATIVE DIAGNOSIS: Lumbar Facet Joint Pain POSTOPERATIVE DIAGNOSIS: same  SEDATION: As per Anesthesia   PROCEDURE PERFORMED:  Lumbar Medial Branch Block at the bilateral L4-5 and L5-S1 levels   ESTIMATED BLOOD LOSS:  None FLUOROSCOPY WAS USED.    PROCEDURE AND FINDINGS:   Patient was greeted in the pre-procedure holding area. The risk, benefits and alternatives to the procedure were again reviewed with the patient and written informed consent was placed in the chart. They were taken to the procedure room and positioned prone on the fluoroscopy table.  Prior to the procedure a time out was completed, verifying correct patient, procedure, and site.     An AP fluoroscopic  film was taken to identify the vertebral bodies, pedicles, transverse processes and superior articulating processes of interest. The skin was prepped with chlorhexidine and draped in the usual sterile fashion. The skin and subcutaneous tissue overlying the aforementioned anatomic targets were anesthetized using a 27-gauge 1-1/2 inch needle with 1% preservative-free lidocaine for a total volume of 6 mls.     Then a 25-gauge 3.5 inch Quincke spinal needle was advanced under fluoroscopic guidance to the junction of the superior articular process and transverse process of the levels of interest (and at the junction of the sacral ala and superior articular process for the L5 dorsal ramus). After negative aspiration, 0.5 mls of contrast was injected under AP view at each level. There was no evidence of intravascular uptake or intrathecal spread on imaging.    At this point, after negative aspiration, a solution of 0.5ml of Bupivacaine 0.5% was injected at each level without incident. The needles were flushed with a small amount of contrast and removed. The needle insertion site was dressed appropriately.   Patient was taken to the recovery room where they were monitored for a brief period of time. They tolerated the procedure well and were discharged home in stable condition with post procedural instructions.

## 2024-09-26 ENCOUNTER — APPOINTMENT (OUTPATIENT)
Dept: PHYSICAL MEDICINE AND REHAB | Facility: CLINIC | Age: 59
End: 2024-09-26
Payer: COMMERCIAL

## 2024-09-26 VITALS
HEART RATE: 96 BPM | WEIGHT: 240 LBS | SYSTOLIC BLOOD PRESSURE: 132 MMHG | BODY MASS INDEX: 30.8 KG/M2 | HEIGHT: 74 IN | DIASTOLIC BLOOD PRESSURE: 86 MMHG | RESPIRATION RATE: 15 BRPM | OXYGEN SATURATION: 97 %

## 2024-09-26 DIAGNOSIS — M48.02 SPINAL STENOSIS, CERVICAL REGION: ICD-10-CM

## 2024-09-26 DIAGNOSIS — M47.816 SPONDYLOSIS W/OUT MYELOPATHY OR RADICULOPATHY, LUMBAR REGION: ICD-10-CM

## 2024-09-26 PROCEDURE — G2211 COMPLEX E/M VISIT ADD ON: CPT | Mod: NC

## 2024-09-26 PROCEDURE — 99214 OFFICE O/P EST MOD 30 MIN: CPT

## 2024-09-26 NOTE — PHYSICAL EXAM
[FreeTextEntry1] : PE: Constitutional: In NAD, calm and cooperative MSK (Back/hip)  Inspection: no gross swelling identified, no swelling or erythema around injection sites  Palpation: Tenderness of the bilateral lumbar paraspinals. No PSIS or hip GT ttp.   ROM: Pain at end with lumbar extension, no pain with lumbar flexion.   Strength: 5/5 strength in bilateral upper and lower extremities  Reflexes: 2+ Biceps/Brachioradialis/Triceps/patella/Achilles reflex bilaterally, Hoffmans/Clonus negative bilaterally  Sensation: Intact to light touch in bilateral upper and lower extremities  Special tests: SLR negative bilaterally, SAGAR/FADIR negative bilaterally, lumbar facet loading positive bilaterally.

## 2024-09-26 NOTE — HISTORY OF PRESENT ILLNESS
[FreeTextEntry1] : Mr. BRYAN OLIVIER is a 59 year old male who presents for follow up. At last visit, he was ordered a repeat bilateral L4-5 and L5-S1 MBB for which he had done on 9/19/24. He got over 80% relief for a few hours after this procedure.   Location: Both neck and low back, but worst in low back Onset: Chronic for years but worsening since Fall 2023, no inciting events Provocation/Palliative: Worst with activity, better with rest Quality:Shooting Radiation: Down LUE, occasionally down RLE with walking and standing. Severity: Moderate to severe Timing: Comes and goes   No bowel/bladder changes. No groin numbness.

## 2024-09-26 NOTE — ASSESSMENT
[FreeTextEntry1] : Mr. BRYAN OLIVIER is a 59 year old male who presents with chronic neck and low back pain, worst in the low back, likely due to underlying spondylosis. Patient has significant spondylosis seen on MRI. He got significant but temporary relief from recent LUCILLE x2. He is now s/p a bilateral L4-5 and L5-S1 MBB on 8/22/24 with 95% relief for 6 hours and s/p a repeat bilateral L4-5 and L5-S1 MBB on 9/19/24 with over 80% relief for a few hours. Denies any red flag signs. Will recommend: - Given that he received 95% relief from his previous bilateral L4-5 and L5-S1 MBB on 8/22/24 for around 6 hours, we discussed with patient the risks (including but not limited to bleeding, infection, nerve damage, etc), benefits and alternatives to a RFA for which patient understands. Will plan on a Left followed by right L4-5 and L5-S1 RFA.  - Patient to f/u with neurosurgery regarding his significant cervical stenosis - He will continue on occasional Tylenol  Return for procedure. Patient aware of red flag signs including any changes to their bowel/bladder control, groin numbness or new weakness. Patient knows to seek immediate attention by calling 911 or going to nearest ER if these symptoms appear.  This patient is being managed for a complex chronic pain that requires ongoing medical management. The nature of this condition requires a longitudinal relationship and monitoring over time for appropriate treatment. complains of pain/discomfort

## 2024-10-17 ENCOUNTER — APPOINTMENT (OUTPATIENT)
Dept: PHYSICAL MEDICINE AND REHAB | Facility: AMBULATORY SURGERY CENTER | Age: 59
End: 2024-10-17
Payer: COMMERCIAL

## 2024-10-17 DIAGNOSIS — M47.816 SPONDYLOSIS W/OUT MYELOPATHY OR RADICULOPATHY, LUMBAR REGION: ICD-10-CM

## 2024-10-17 PROCEDURE — 64635 DESTROY LUMB/SAC FACET JNT: CPT | Mod: LT

## 2024-10-17 PROCEDURE — 64636 DESTROY L/S FACET JNT ADDL: CPT | Mod: LT

## 2024-11-07 ENCOUNTER — APPOINTMENT (OUTPATIENT)
Dept: PHYSICAL MEDICINE AND REHAB | Facility: AMBULATORY SURGERY CENTER | Age: 59
End: 2024-11-07
Payer: COMMERCIAL

## 2024-11-07 PROCEDURE — 64635 DESTROY LUMB/SAC FACET JNT: CPT | Mod: RT

## 2024-11-07 PROCEDURE — 64636 DESTROY L/S FACET JNT ADDL: CPT | Mod: RT

## 2024-11-11 ENCOUNTER — APPOINTMENT (OUTPATIENT)
Dept: PHYSICAL MEDICINE AND REHAB | Facility: CLINIC | Age: 59
End: 2024-11-11
Payer: COMMERCIAL

## 2024-11-11 VITALS
BODY MASS INDEX: 30.8 KG/M2 | DIASTOLIC BLOOD PRESSURE: 69 MMHG | RESPIRATION RATE: 14 BRPM | SYSTOLIC BLOOD PRESSURE: 128 MMHG | HEIGHT: 74 IN | WEIGHT: 240 LBS | HEART RATE: 72 BPM

## 2024-11-11 DIAGNOSIS — M47.816 SPONDYLOSIS W/OUT MYELOPATHY OR RADICULOPATHY, LUMBAR REGION: ICD-10-CM

## 2024-11-11 DIAGNOSIS — M79.18 MYALGIA, OTHER SITE: ICD-10-CM

## 2024-11-11 PROCEDURE — G2211 COMPLEX E/M VISIT ADD ON: CPT | Mod: NC

## 2024-11-11 PROCEDURE — 99024 POSTOP FOLLOW-UP VISIT: CPT

## 2024-11-11 RX ORDER — DICLOFENAC SODIUM 75 MG/1
75 TABLET, DELAYED RELEASE ORAL
Qty: 28 | Refills: 0 | Status: ACTIVE | COMMUNITY
Start: 2024-11-11 | End: 1900-01-01

## 2024-11-14 ENCOUNTER — APPOINTMENT (OUTPATIENT)
Dept: NEUROSURGERY | Facility: CLINIC | Age: 59
End: 2024-11-14
Payer: COMMERCIAL

## 2024-11-14 VITALS
WEIGHT: 240 LBS | SYSTOLIC BLOOD PRESSURE: 150 MMHG | BODY MASS INDEX: 30.8 KG/M2 | HEIGHT: 74 IN | TEMPERATURE: 97.9 F | OXYGEN SATURATION: 98 % | HEART RATE: 69 BPM | DIASTOLIC BLOOD PRESSURE: 81 MMHG

## 2024-11-14 DIAGNOSIS — M51.16 INTERVERTEBRAL DISC DISORDERS WITH RADICULOPATHY, LUMBAR REGION: ICD-10-CM

## 2024-11-14 DIAGNOSIS — M48.02 SPINAL STENOSIS, CERVICAL REGION: ICD-10-CM

## 2024-11-14 PROCEDURE — 99214 OFFICE O/P EST MOD 30 MIN: CPT

## 2025-06-10 ENCOUNTER — APPOINTMENT (OUTPATIENT)
Dept: ORTHOPEDIC SURGERY | Facility: CLINIC | Age: 60
End: 2025-06-10
Payer: COMMERCIAL

## 2025-06-10 VITALS — WEIGHT: 240 LBS | HEIGHT: 74 IN | BODY MASS INDEX: 30.8 KG/M2

## 2025-06-10 PROCEDURE — 99214 OFFICE O/P EST MOD 30 MIN: CPT | Mod: 25

## 2025-06-10 PROCEDURE — 20610 DRAIN/INJ JOINT/BURSA W/O US: CPT | Mod: RT

## 2025-06-10 PROCEDURE — 20611 DRAIN/INJ JOINT/BURSA W/US: CPT | Mod: LT

## 2025-06-10 RX ORDER — HYALURONATE SODIUM, STABILIZED 88 MG/4 ML
88 SYRINGE (ML) INTRAARTICULAR
Qty: 2 | Refills: 0 | Status: ACTIVE | COMMUNITY
Start: 2025-06-10 | End: 1900-01-01

## 2025-07-15 ENCOUNTER — APPOINTMENT (OUTPATIENT)
Dept: ORTHOPEDIC SURGERY | Facility: CLINIC | Age: 60
End: 2025-07-15
Payer: COMMERCIAL

## 2025-07-15 PROCEDURE — 99214 OFFICE O/P EST MOD 30 MIN: CPT | Mod: 25

## 2025-07-15 PROCEDURE — 20611 DRAIN/INJ JOINT/BURSA W/US: CPT | Mod: LT

## 2025-07-15 PROCEDURE — 20610 DRAIN/INJ JOINT/BURSA W/O US: CPT | Mod: RT
